# Patient Record
Sex: FEMALE | Race: WHITE | Employment: FULL TIME | URBAN - METROPOLITAN AREA
[De-identification: names, ages, dates, MRNs, and addresses within clinical notes are randomized per-mention and may not be internally consistent; named-entity substitution may affect disease eponyms.]

---

## 2021-03-17 ENCOUNTER — TRANSCRIBE ORDER (OUTPATIENT)
Dept: SCHEDULING | Age: 61
End: 2021-03-17

## 2021-03-17 DIAGNOSIS — Z12.31 ENCOUNTER FOR SCREENING MAMMOGRAM FOR MALIGNANT NEOPLASM OF BREAST: Primary | ICD-10-CM

## 2023-09-09 ENCOUNTER — HOSPITAL ENCOUNTER (EMERGENCY)
Age: 63
Discharge: HOME | End: 2023-09-09
Attending: STUDENT IN AN ORGANIZED HEALTH CARE EDUCATION/TRAINING PROGRAM
Payer: COMMERCIAL

## 2023-09-09 ENCOUNTER — APPOINTMENT (OUTPATIENT)
Dept: CT IMAGING | Age: 63
End: 2023-09-09
Payer: COMMERCIAL

## 2023-09-09 ENCOUNTER — APPOINTMENT (OUTPATIENT)
Dept: GENERAL RADIOLOGY | Age: 63
End: 2023-09-09
Payer: COMMERCIAL

## 2023-09-09 VITALS
DIASTOLIC BLOOD PRESSURE: 73 MMHG | HEART RATE: 82 BPM | SYSTOLIC BLOOD PRESSURE: 122 MMHG | OXYGEN SATURATION: 94 % | BODY MASS INDEX: 20.25 KG/M2 | HEIGHT: 66 IN | RESPIRATION RATE: 25 BRPM | TEMPERATURE: 98.4 F | WEIGHT: 126 LBS

## 2023-09-09 DIAGNOSIS — J18.9 PNEUMONIA DUE TO INFECTIOUS ORGANISM, UNSPECIFIED LATERALITY, UNSPECIFIED PART OF LUNG: Primary | ICD-10-CM

## 2023-09-09 LAB
ALBUMIN SERPL-MCNC: 3.4 G/DL (ref 3.2–4.6)
ALBUMIN/GLOB SERPL: 0.7 (ref 0.4–1.6)
ALP SERPL-CCNC: 97 U/L (ref 50–136)
ALT SERPL-CCNC: 15 U/L (ref 12–65)
ANION GAP SERPL CALC-SCNC: 2 MMOL/L (ref 2–11)
AST SERPL-CCNC: 13 U/L (ref 15–37)
BASOPHILS # BLD: 0.1 K/UL (ref 0–0.2)
BASOPHILS NFR BLD: 1 % (ref 0–2)
BILIRUB SERPL-MCNC: 0.5 MG/DL (ref 0.2–1.1)
BUN SERPL-MCNC: 10 MG/DL (ref 8–23)
CALCIUM SERPL-MCNC: 9.6 MG/DL (ref 8.3–10.4)
CHLORIDE SERPL-SCNC: 108 MMOL/L (ref 101–110)
CO2 SERPL-SCNC: 32 MMOL/L (ref 21–32)
CREAT SERPL-MCNC: 1 MG/DL (ref 0.6–1)
DIFFERENTIAL METHOD BLD: ABNORMAL
EOSINOPHIL # BLD: 0.2 K/UL (ref 0–0.8)
EOSINOPHIL NFR BLD: 1 % (ref 0.5–7.8)
ERYTHROCYTE [DISTWIDTH] IN BLOOD BY AUTOMATED COUNT: 12 % (ref 11.9–14.6)
GLOBULIN SER CALC-MCNC: 4.8 G/DL (ref 2.8–4.5)
GLUCOSE SERPL-MCNC: 108 MG/DL (ref 65–100)
HCT VFR BLD AUTO: 44.5 % (ref 35.8–46.3)
HGB BLD-MCNC: 14.2 G/DL (ref 11.7–15.4)
IMM GRANULOCYTES # BLD AUTO: 0.1 K/UL (ref 0–0.5)
IMM GRANULOCYTES NFR BLD AUTO: 1 % (ref 0–5)
LYMPHOCYTES # BLD: 1.8 K/UL (ref 0.5–4.6)
LYMPHOCYTES NFR BLD: 13 % (ref 13–44)
MAGNESIUM SERPL-MCNC: 2.1 MG/DL (ref 1.8–2.4)
MCH RBC QN AUTO: 29.3 PG (ref 26.1–32.9)
MCHC RBC AUTO-ENTMCNC: 31.9 G/DL (ref 31.4–35)
MCV RBC AUTO: 91.8 FL (ref 82–102)
MONOCYTES # BLD: 0.6 K/UL (ref 0.1–1.3)
MONOCYTES NFR BLD: 4 % (ref 4–12)
NEUTS SEG # BLD: 11.2 K/UL (ref 1.7–8.2)
NEUTS SEG NFR BLD: 80 % (ref 43–78)
NRBC # BLD: 0 K/UL (ref 0–0.2)
PLATELET # BLD AUTO: 412 K/UL (ref 150–450)
PMV BLD AUTO: 9.5 FL (ref 9.4–12.3)
POTASSIUM SERPL-SCNC: 3.8 MMOL/L (ref 3.5–5.1)
PROT SERPL-MCNC: 8.2 G/DL (ref 6.3–8.2)
RBC # BLD AUTO: 4.85 M/UL (ref 4.05–5.2)
SODIUM SERPL-SCNC: 142 MMOL/L (ref 133–143)
TROPONIN I SERPL HS-MCNC: 3.2 PG/ML (ref 0–14)
WBC # BLD AUTO: 13.9 K/UL (ref 4.3–11.1)

## 2023-09-09 PROCEDURE — 80053 COMPREHEN METABOLIC PANEL: CPT

## 2023-09-09 PROCEDURE — 93005 ELECTROCARDIOGRAM TRACING: CPT | Performed by: STUDENT IN AN ORGANIZED HEALTH CARE EDUCATION/TRAINING PROGRAM

## 2023-09-09 PROCEDURE — 99285 EMERGENCY DEPT VISIT HI MDM: CPT

## 2023-09-09 PROCEDURE — 84484 ASSAY OF TROPONIN QUANT: CPT

## 2023-09-09 PROCEDURE — 71260 CT THORAX DX C+: CPT

## 2023-09-09 PROCEDURE — 83735 ASSAY OF MAGNESIUM: CPT

## 2023-09-09 PROCEDURE — 85025 COMPLETE CBC W/AUTO DIFF WBC: CPT

## 2023-09-09 PROCEDURE — 71045 X-RAY EXAM CHEST 1 VIEW: CPT

## 2023-09-09 PROCEDURE — 6360000004 HC RX CONTRAST MEDICATION

## 2023-09-09 RX ORDER — DOXYCYCLINE HYCLATE 100 MG
100 TABLET ORAL 2 TIMES DAILY
Qty: 14 TABLET | Refills: 0 | Status: SHIPPED | OUTPATIENT
Start: 2023-09-09 | End: 2023-09-16

## 2023-09-09 RX ORDER — BENZONATATE 100 MG/1
100 CAPSULE ORAL 3 TIMES DAILY PRN
Qty: 30 CAPSULE | Refills: 0 | Status: SHIPPED | OUTPATIENT
Start: 2023-09-09 | End: 2023-09-19

## 2023-09-09 RX ADMIN — IOPAMIDOL 100 ML: 755 INJECTION, SOLUTION INTRAVENOUS at 21:17

## 2023-09-09 ASSESSMENT — LIFESTYLE VARIABLES
HOW MANY STANDARD DRINKS CONTAINING ALCOHOL DO YOU HAVE ON A TYPICAL DAY: PATIENT DOES NOT DRINK
HOW OFTEN DO YOU HAVE A DRINK CONTAINING ALCOHOL: NEVER

## 2023-09-09 ASSESSMENT — PAIN - FUNCTIONAL ASSESSMENT: PAIN_FUNCTIONAL_ASSESSMENT: 0-10

## 2023-09-09 ASSESSMENT — PAIN DESCRIPTION - LOCATION: LOCATION: CHEST

## 2023-09-09 ASSESSMENT — PAIN SCALES - GENERAL: PAINLEVEL_OUTOF10: 4

## 2023-09-09 NOTE — ED TRIAGE NOTES
Patient to ER from home with c/o of having been to urgent care and was sent here, patient has been on antibiotics since Friday, but has been sick for over a month, patient states her PCP did covid, strep, and flu and all were negative, patient has cough at that time, has been told she has COPD

## 2023-09-10 LAB
EKG ATRIAL RATE: 90 BPM
EKG DIAGNOSIS: NORMAL
EKG P AXIS: 81 DEGREES
EKG P-R INTERVAL: 157 MS
EKG Q-T INTERVAL: 340 MS
EKG QRS DURATION: 80 MS
EKG QTC CALCULATION (BAZETT): 414 MS
EKG R AXIS: 84 DEGREES
EKG T AXIS: 68 DEGREES
EKG VENTRICULAR RATE: 89 BPM

## 2023-09-10 PROCEDURE — 93010 ELECTROCARDIOGRAM REPORT: CPT | Performed by: INTERNAL MEDICINE

## 2023-09-10 NOTE — DISCHARGE INSTRUCTIONS
Take antibiotics as prescribed. Continue taking your Augmentin along with doxycycline. Use caution with sun exposure. Take Tessalon Perles as needed for cough. Follow-up with your family physician within the week. Return to the ER for worsening or worrisome symptoms. You have been referred to pulmonology, contact their office if you do not hear from them in 2-3 business days. Below is the result from your CT scan, radiology recommended outpatient follow-up in 8 weeks to ensure resolution of findings     1. Moderate patchy bilateral tree-in-bud nodular and minor consolidative   opacities, probable bronchopneumonia. Recommend CT chest followed in eight weeks    to exclude underlying mass nodule. 2.  No evidence of pulmonary embolus.

## 2023-09-10 NOTE — ED PROVIDER NOTES
Emergency Department Provider Note       PCP: Enriqueta Anderson MD   Age: 66 y.o. Sex: female     DISPOSITION Decision To Discharge 09/09/2023 10:20:33 PM       ICD-10-CM    1. Pneumonia due to infectious organism, unspecified laterality, unspecified part of lung  J18.9 [facility]          Medical Decision Making     Complexity of Problems Addressed:  1 acute on chronic complaint requiring work-up    Data Reviewed and Analyzed:   I independently ordered and reviewed each unique test.  I reviewed external records: provider visit note from PCP. The patients assessment required an independent historian: Family. The reason they were needed is important historical information not provided by the patient. I independently ordered and interpreted the ED EKG in the absence of a Cardiologist.    Rate: 89  EKG Interpretation: EKG Interpretation: sinus rhythm, no evidence of arrhythmia  ST Segments: Nonspecific ST segments - NO STEMI  I interpreted the X-rays no pneumothorax. I interpreted the CT Scan no pneumothorax. Discussion of management or test interpretation. 63F presents emergency department with family bedside. Patient complains of shortness of breath, cough and intermittent chest pressure. Reports the pressure occurs when she is having coughing fits. Has been on 2 rounds antibiotics, was placed on Levaquin few weeks ago without any resolution, started on Augmentin today. Sent to the ER from urgent care for further evaluation. COVID strep and flu have all been negative on outpatient work-up. Patient believes she possibly could have been exposed to mold and other fungi. Denies history of lung issues. Patient reports having episodes of coughing fits and shortness of breath a few times a year. Denies seeing pulmonologist.  Does not smoke cigarettes. Will obtain a broad-based work-up.   Labs shows white count 13.9, stable H&H, normal electrolytes and kidney

## 2023-09-14 ENCOUNTER — OFFICE VISIT (OUTPATIENT)
Dept: PULMONOLOGY | Age: 63
End: 2023-09-14
Payer: COMMERCIAL

## 2023-09-14 ENCOUNTER — PREP FOR PROCEDURE (OUTPATIENT)
Dept: PULMONOLOGY | Age: 63
End: 2023-09-14

## 2023-09-14 VITALS
HEART RATE: 80 BPM | DIASTOLIC BLOOD PRESSURE: 80 MMHG | WEIGHT: 128 LBS | BODY MASS INDEX: 20.57 KG/M2 | SYSTOLIC BLOOD PRESSURE: 122 MMHG | TEMPERATURE: 97.5 F | OXYGEN SATURATION: 96 % | RESPIRATION RATE: 18 BRPM | HEIGHT: 66 IN

## 2023-09-14 DIAGNOSIS — R91.8 ABNORMAL CT SCAN OF LUNG: ICD-10-CM

## 2023-09-14 DIAGNOSIS — R06.02 SOB (SHORTNESS OF BREATH): Primary | ICD-10-CM

## 2023-09-14 DIAGNOSIS — R06.02 SOB (SHORTNESS OF BREATH): ICD-10-CM

## 2023-09-14 PROBLEM — J18.9 PNEUMONIA: Status: ACTIVE | Noted: 2023-09-14

## 2023-09-14 LAB
EXPIRATORY TIME: NORMAL
FEF 25-75% %PRED-PRE: NORMAL
FEF 25-75% PRED: NORMAL
FEF 25-75%-PRE: NORMAL
FEV1 %PRED-PRE: 53 %
FEV1 PRED: NORMAL
FEV1/FVC %PRED-PRE: NORMAL
FEV1/FVC PRED: NORMAL
FEV1/FVC: 75 %
FEV1: 1.45 L
FVC %PRED-PRE: 55 %
FVC PRED: NORMAL
FVC: 1.93 L
PEF %PRED-PRE: NORMAL
PEF PRED: NORMAL
PEF-PRE: NORMAL

## 2023-09-14 PROCEDURE — 94010 BREATHING CAPACITY TEST: CPT | Performed by: INTERNAL MEDICINE

## 2023-09-14 PROCEDURE — 99204 OFFICE O/P NEW MOD 45 MIN: CPT | Performed by: NURSE PRACTITIONER

## 2023-09-14 RX ORDER — BACILLUS COAGULANS/INULIN 1B-250 MG
CAPSULE ORAL
COMMUNITY

## 2023-09-14 RX ORDER — SODIUM CHLORIDE 0.9 % (FLUSH) 0.9 %
5-40 SYRINGE (ML) INJECTION EVERY 12 HOURS SCHEDULED
Status: CANCELLED | OUTPATIENT
Start: 2023-09-14

## 2023-09-14 RX ORDER — MULTIVIT WITH MINERALS/LUTEIN
TABLET ORAL
COMMUNITY

## 2023-09-14 RX ORDER — ALBUTEROL SULFATE 90 UG/1
AEROSOL, METERED RESPIRATORY (INHALATION)
COMMUNITY
Start: 2023-09-08

## 2023-09-14 RX ORDER — SODIUM CHLORIDE 9 MG/ML
INJECTION, SOLUTION INTRAVENOUS PRN
Status: CANCELLED | OUTPATIENT
Start: 2023-09-14

## 2023-09-14 RX ORDER — SODIUM CHLORIDE 0.9 % (FLUSH) 0.9 %
5-40 SYRINGE (ML) INJECTION PRN
Status: CANCELLED | OUTPATIENT
Start: 2023-09-14

## 2023-09-14 RX ORDER — AMOXICILLIN AND CLAVULANATE POTASSIUM 875; 125 MG/1; MG/1
1 TABLET, FILM COATED ORAL EVERY 12 HOURS
COMMUNITY
Start: 2023-09-08 | End: 2023-09-15

## 2023-09-14 RX ORDER — ACETAMINOPHEN 160 MG
TABLET,DISINTEGRATING ORAL
COMMUNITY

## 2023-09-14 ASSESSMENT — PULMONARY FUNCTION TESTS
FEV1: 1.45
FVC_PERCENT_PREDICTED_PRE: 55
FVC: 1.93
FEV1/FVC: 75
FEV1_PERCENT_PREDICTED_PRE: 53

## 2023-09-14 NOTE — H&P (VIEW-ONLY)
Name:  Kerri Navarro  YOB: 1960   MRN: [id number]      Office Visit: 2023        ASSESSMENT AND PLAN:  (Medical Decision Making)    Impression: 66 y.o. female with a history of multiple bouts of \"pneumonia and pneumonitis\" with chronic cough during these episodes that seem to be precipitated by mold exposure. 1. SOB (shortness of breath)  --Significant with cough. Currently taking doxycycline, so would finish this. --She is hesitant to use her albuterol stating that she read that it can spread fungal infections. Explained that if she is struggling with her breathing that might, she needs to use her albuterol.  - NITRIC OXIDE  GAS DETERMINATION  - Spirometry Without Bronchodilator  - Allergen Fungi & Mold A. Fumigatus, IgE; Future  - IgE; Future  - VANI, Direct, w/Reflex; Future  - Hypersensitivity pnuemonitis profile; Future  - Bordetella Pertussis / Parapertussis PCR; Future  - ANCA Panel; Future    2. Abnormal CT scan of lung  --Differential is wide fullness. MAC, poorly controlled asthma, less concern for ABPA, but will check an ANCA as well as an IgE against Aspergillus. We will set up for [facility] for Tuesday next week for cleanout as well as a BAL and fungal cultures along with an AFP. --Sending for labs today including hypersensitivity, pertussis, IgE, VANI    - Allergen Fungi & Mold A. Fumigatus, IgE; Future  - IgE; Future  - VANI, Direct, w/Reflex; Future  - Hypersensitivity pnuemonitis profile; Future  - Bordetella Pertussis / Parapertussis PCR; Future  - ANCA Panel; Future    No orders of the defined types were placed in this encounter. No orders of the defined types were placed in this encounter. Images and case were reviewed with Dr. Debbie Olivares since he will be doing her bronchoscopy on Tuesday. Follow-up and Dispositions    Return in about 6 weeks (around 10/26/2023) for dyspnea, Ryanne or Sine.        TING See - CNP    No specialty comments

## 2023-09-14 NOTE — PROGRESS NOTES
Name:  Viral Martinez  YOB: 1960   MRN: [id number]      Office Visit: 2023        ASSESSMENT AND PLAN:  (Medical Decision Making)    Impression: 66 y.o. female with a history of multiple bouts of \"pneumonia and pneumonitis\" with chronic cough during these episodes that seem to be precipitated by mold exposure. 1. SOB (shortness of breath)  --Significant with cough. Currently taking doxycycline, so would finish this. --She is hesitant to use her albuterol stating that she read that it can spread fungal infections. Explained that if she is struggling with her breathing that might, she needs to use her albuterol.  - NITRIC OXIDE  GAS DETERMINATION  - Spirometry Without Bronchodilator  - Allergen Fungi & Mold A. Fumigatus, IgE; Future  - IgE; Future  - VANI, Direct, w/Reflex; Future  - Hypersensitivity pnuemonitis profile; Future  - Bordetella Pertussis / Parapertussis PCR; Future  - ANCA Panel; Future    2. Abnormal CT scan of lung  --Differential is wide fullness. MAC, poorly controlled asthma, less concern for ABPA, but will check an ANCA as well as an IgE against Aspergillus. We will set up for [facility] for Tuesday next week for cleanout as well as a BAL and fungal cultures along with an AFP. --Sending for labs today including hypersensitivity, pertussis, IgE, VANI    - Allergen Fungi & Mold A. Fumigatus, IgE; Future  - IgE; Future  - VANI, Direct, w/Reflex; Future  - Hypersensitivity pnuemonitis profile; Future  - Bordetella Pertussis / Parapertussis PCR; Future  - ANCA Panel; Future    No orders of the defined types were placed in this encounter. No orders of the defined types were placed in this encounter. Images and case were reviewed with Dr. Alejandro Angeles since he will be doing her bronchoscopy on Tuesday. Follow-up and Dispositions    Return in about 6 weeks (around 10/26/2023) for dyspnea, Ryanne or Sine.        Caleb Duane, APRN - CNP    No specialty comments

## 2023-09-15 NOTE — PERIOP NOTE
Patient verified name and . Order for consent  found in EHR and matches case posting; patient verifies procedure. Type 1B surgery, PAT phone assessment complete. Orders received. Labs per surgeon: none  Labs per anesthesia protocol: none indicated    Patient answered medical/surgical history questions at their best of ability. All prior to admission medications documented in EPIC. Patient instructed to take the following medications the day of surgery according to anesthesia guidelines with a small sip of water: use and bring albuterol inhaler On the day before surgery please take Tylenol (Acetaminophen) 1000mg in the morning and then again before bed. You may substitute for Tylenol 650 mg. Hold all vitamins 7 days prior to surgery and NSAIDS 5 days prior to surgery. Prescription meds to hold:vitamins and supplements  Patient instructed on the following:    > Arrive at [facility], time of arrival to be called the day before by 1700  > NPO after midnight, unless otherwise indicated, including gum, mints, and ice chips  > Responsible adult must drive patient to the hospital, stay during surgery, and patient will need supervision 24 hours after anesthesia  > Use antibacterial soap in shower the night before surgery and on the morning of surgery  > All piercings must be removed prior to arrival.    > Leave all valuables (money and jewelry) at home but bring insurance card and ID on DOS.   > You may be required to pay a deductible or co-pay on the day of your procedure. You can pre-pay by calling [phone] if your surgery is at the [facility] or [phone] if your surgery is at the [facility]. > Do not wear make-up, nail polish, lotions, cologne, perfumes, powders, or oil on skin. Artificial nails are not permitted.

## 2023-09-16 LAB — ANA SER QL: NEGATIVE

## 2023-09-17 LAB
A FUMIGATUS IGE QN: <0.1 KU/L
IGE SERPL-ACNC: 60 IU/ML (ref 6–495)

## 2023-09-18 ENCOUNTER — TELEPHONE (OUTPATIENT)
Dept: PULMONOLOGY | Age: 63
End: 2023-09-18

## 2023-09-18 LAB
C-ANCA TITR SER IF: NORMAL TITER
MYELOPEROXIDASE AB SER IA-ACNC: <0.2 UNITS (ref 0–0.9)
P-ANCA ATYPICAL TITR SER IF: NORMAL TITER
P-ANCA TITR SER IF: NORMAL TITER
PROTEINASE3 AB SER IA-ACNC: <0.2 UNITS (ref 0–0.9)

## 2023-09-18 NOTE — RESULT ENCOUNTER NOTE
Please let patient know that several lab results back and so far normal.  Will update her as new ones come in.

## 2023-09-18 NOTE — TELEPHONE ENCOUNTER
Called and spoke with the patient to let her know as per Ms. Sage Rose several of her lab results back and so far normal.  Will update her as new ones come in. Patient voice understanding.  Jasen BONNER

## 2023-09-19 ENCOUNTER — HOSPITAL ENCOUNTER (OUTPATIENT)
Age: 63
Setting detail: OUTPATIENT SURGERY
Discharge: HOME | End: 2023-09-19
Attending: INTERNAL MEDICINE | Admitting: INTERNAL MEDICINE
Payer: COMMERCIAL

## 2023-09-19 VITALS
BODY MASS INDEX: 20.41 KG/M2 | HEIGHT: 66 IN | RESPIRATION RATE: 18 BRPM | TEMPERATURE: 97.7 F | DIASTOLIC BLOOD PRESSURE: 70 MMHG | WEIGHT: 127 LBS | SYSTOLIC BLOOD PRESSURE: 122 MMHG | HEART RATE: 65 BPM | OXYGEN SATURATION: 100 %

## 2023-09-19 DIAGNOSIS — J18.9 PNEUMONIA: ICD-10-CM

## 2023-09-19 PROBLEM — R93.89 ABNORMAL CT OF THE CHEST: Status: ACTIVE | Noted: 2023-09-19

## 2023-09-19 PROBLEM — R05.3 CHRONIC COUGH: Status: ACTIVE | Noted: 2023-09-19

## 2023-09-19 LAB
DIFFERENTIAL: NORMAL
EOSINOPHIL NFR BRONCH MANUAL: 0 %
LYMPHOCYTES NFR BRONCH MANUAL: 38 %
MACROPHAGES NFR BRONCH MANUAL: 6 %
NEUTROPHILS NFR BRONCH MANUAL: 56 %
OTHER CELLS NFR BLD MANUAL: 3

## 2023-09-19 PROCEDURE — 94640 AIRWAY INHALATION TREATMENT: CPT

## 2023-09-19 PROCEDURE — 88112 CYTOPATH CELL ENHANCE TECH: CPT

## 2023-09-19 PROCEDURE — 87799 DETECT AGENT NOS DNA QUANT: CPT

## 2023-09-19 PROCEDURE — 87102 FUNGUS ISOLATION CULTURE: CPT

## 2023-09-19 PROCEDURE — 2709999900 HC NON-CHARGEABLE SUPPLY: Performed by: INTERNAL MEDICINE

## 2023-09-19 PROCEDURE — 94760 N-INVAS EAR/PLS OXIMETRY 1: CPT

## 2023-09-19 PROCEDURE — 87206 SMEAR FLUORESCENT/ACID STAI: CPT

## 2023-09-19 PROCEDURE — 99152 MOD SED SAME PHYS/QHP 5/>YRS: CPT | Performed by: INTERNAL MEDICINE

## 2023-09-19 PROCEDURE — 6360000002 HC RX W HCPCS: Performed by: INTERNAL MEDICINE

## 2023-09-19 PROCEDURE — 87116 MYCOBACTERIA CULTURE: CPT

## 2023-09-19 PROCEDURE — 87070 CULTURE OTHR SPECIMN AEROBIC: CPT

## 2023-09-19 PROCEDURE — 89051 BODY FLUID CELL COUNT: CPT

## 2023-09-19 PROCEDURE — 31624 DX BRONCHOSCOPE/LAVAGE: CPT | Performed by: INTERNAL MEDICINE

## 2023-09-19 PROCEDURE — 87185 SC STD ENZYME DETCJ PER NZM: CPT

## 2023-09-19 PROCEDURE — 87486 CHLMYD PNEUM DNA AMP PROBE: CPT

## 2023-09-19 PROCEDURE — 7100000010 HC PHASE II RECOVERY - FIRST 15 MIN: Performed by: INTERNAL MEDICINE

## 2023-09-19 PROCEDURE — 87541 LEGION PNEUMO DNA AMP PROB: CPT

## 2023-09-19 PROCEDURE — 87205 SMEAR GRAM STAIN: CPT

## 2023-09-19 PROCEDURE — 2580000003 HC RX 258: Performed by: INTERNAL MEDICINE

## 2023-09-19 PROCEDURE — 3609027000 HC BRONCHOSCOPY: Performed by: INTERNAL MEDICINE

## 2023-09-19 PROCEDURE — 7100000011 HC PHASE II RECOVERY - ADDTL 15 MIN: Performed by: INTERNAL MEDICINE

## 2023-09-19 PROCEDURE — 87581 M.PNEUMON DNA AMP PROBE: CPT

## 2023-09-19 RX ORDER — ALBUTEROL SULFATE 2.5 MG/3ML
2.5 SOLUTION RESPIRATORY (INHALATION) ONCE
Status: COMPLETED | OUTPATIENT
Start: 2023-09-19 | End: 2023-09-19

## 2023-09-19 RX ORDER — SODIUM CHLORIDE, SODIUM LACTATE, POTASSIUM CHLORIDE, CALCIUM CHLORIDE 600; 310; 30; 20 MG/100ML; MG/100ML; MG/100ML; MG/100ML
INJECTION, SOLUTION INTRAVENOUS CONTINUOUS
Status: DISCONTINUED | OUTPATIENT
Start: 2023-09-19 | End: 2023-09-19 | Stop reason: HOSPADM

## 2023-09-19 RX ORDER — SODIUM CHLORIDE 0.9 % (FLUSH) 0.9 %
5-40 SYRINGE (ML) INJECTION PRN
Status: DISCONTINUED | OUTPATIENT
Start: 2023-09-19 | End: 2023-09-19 | Stop reason: HOSPADM

## 2023-09-19 RX ORDER — SODIUM CHLORIDE 9 MG/ML
INJECTION, SOLUTION INTRAVENOUS PRN
Status: DISCONTINUED | OUTPATIENT
Start: 2023-09-19 | End: 2023-09-19 | Stop reason: HOSPADM

## 2023-09-19 RX ORDER — SODIUM CHLORIDE 0.9 % (FLUSH) 0.9 %
5-40 SYRINGE (ML) INJECTION EVERY 12 HOURS SCHEDULED
Status: DISCONTINUED | OUTPATIENT
Start: 2023-09-19 | End: 2023-09-19 | Stop reason: HOSPADM

## 2023-09-19 RX ORDER — MIDAZOLAM HYDROCHLORIDE 2 MG/2ML
INJECTION, SOLUTION INTRAMUSCULAR; INTRAVENOUS PRN
Status: DISCONTINUED | OUTPATIENT
Start: 2023-09-19 | End: 2023-09-19 | Stop reason: ALTCHOICE

## 2023-09-19 RX ADMIN — ALBUTEROL SULFATE 2.5 MG: 2.5 SOLUTION RESPIRATORY (INHALATION) at 09:11

## 2023-09-19 RX ADMIN — SODIUM CHLORIDE, POTASSIUM CHLORIDE, SODIUM LACTATE AND CALCIUM CHLORIDE: 600; 310; 30; 20 INJECTION, SOLUTION INTRAVENOUS at 07:57

## 2023-09-19 ASSESSMENT — PAIN SCALES - GENERAL
PAINLEVEL_OUTOF10: 0

## 2023-09-19 ASSESSMENT — PAIN - FUNCTIONAL ASSESSMENT
PAIN_FUNCTIONAL_ASSESSMENT: WONG-BAKER FACES
PAIN_FUNCTIONAL_ASSESSMENT: NONE - DENIES PAIN
PAIN_FUNCTIONAL_ASSESSMENT: WONG-BAKER FACES
PAIN_FUNCTIONAL_ASSESSMENT: 0-10

## 2023-09-19 NOTE — INTERVAL H&P NOTE
Update History & Physical    The patient's History and Physical of September 14,  was reviewed with the patient and I examined the patient. There was no change. The surgical site was confirmed by the patient and me. Plan: The risks, benefits, expected outcome, and alternative to the recommended procedure have been discussed with the patient. Patient understands and wants to proceed with the procedure.      Electronically signed by Stephanie Vincent MD on 9/19/2023 at 8:24 AM

## 2023-09-19 NOTE — OP NOTE
PROCEDURE    Bronchoscopy with airway inspection /therapeutic aspiration of secretions /BAL    INDICATION   Cough  Abnormal CT chest with tree in bud opacities      EQUIPMENT:  Olympus  Bronchoscope    ANESTHESIA    Concious sedation with: Fentanyl 100 mcg IV; Versed 2mg IV; Lidocaine 200 mg to tracheo-bronchial tree and vocal cords    IMAGING:    CT Chest  9/9/23      AIRWAY INSPECTION    After obtaining informed consent, Olympus  Bronchoscopewas introduced into the trachea without complication. RIGHT    LOCATION NORM/ABNORM DESCRIPTION   Larynx NL    VOCAL CORDS NL    TRACHEA NL    FRANKLIN NL    RMSB NL    RUL NL    BI NL    RML NL    RLL NL    SUP SEGM RLL NL    MED BASAL NL    ANTERIOR BASAL NL    LATERAL BASAL NL    POSTERIOR BASAL NL               LEFT    LOCATION NORM/ABNORM DESCRIPTION   LMSB NL    NOE NL    LINGULA NL    LLL NL    SUPERIOR SEG LLL NL    PEARL-MEDIAL LLL NL    LATERAL LLL NL    POSTERIOR LLL NL      The following samples were obtained:    BAL: RML    Samples were sent for:  Cell count, diff, culture, afb, fungal, cytology, aspergillus pcr, atypical pna, legionella    The procedure was completed without complication and the patient tolerated the procedure well. EBL: None    Recommendations: Follow up the above studies and treat accordingly.      Desmond Hamman, MD

## 2023-09-19 NOTE — PROGRESS NOTES
0850-Pt coughing. Dr. Bettye Lai at bedside. Albuterol nebulizer ordered. Diana Sepulveda, RT notified. 0912-Post nebulizer, pts cough more infrequent. Discharge instructions were reviewed with patient and Leticia Perry. An opportunity was given for questions. Patient and Leticia Perry verbalized understanding, and has no questions at this time.

## 2023-09-19 NOTE — DISCHARGE INSTRUCTIONS
RESPIRATORY CARE - BRONCHOSCOPY - DISCHARGE INSTRUCTIONS      You received a lot of numbing medication for your throat and nose, and you also received medication to make you sleepy during your procedure. Because of this and because the bronchoscopy may have irritated your airways, we ask that you follow these directions:    1. Do not eat or drink until  0940 . After that, you may have what you please. You may want to try some liquids first, because your throat may be a little sore. 2. Medication may cause drowsiness for several hours, therefore:  Do not drive or operate machinery for remainder of the day. No alcohol today  Do not make any important or legal decisions for 24 hours  Do not sign any legal documents for 24 hours    3. You may cough up more mucus than usual and you may see some blood, but this is expected and should subside by the following day. 4. If severe throat irritation, coughing, or bleeding continue, call your doctor. 5.         If you run a fever greater than 102, call [facility] at [phone]. 6.         Dr. Roberto Downey has asked that you:                A. Call the doctor's office at [phone] for any questions or problems occur. B. See him in the office as previously scheduled prior to procedure.     Discharge Medications:  Current Discharge Medication List        CONTINUE these medications which have NOT CHANGED    Details   albuterol sulfate HFA (PROVENTIL;VENTOLIN;PROAIR) 108 (90 Base) MCG/ACT inhaler INHALE 2 PUFFS EVERY 4 TO 6 HOURS AS NEEDED FOR SHORTNESS OF BREATH OR FOR WHEEZE      Bacillus Coagulans-Inulin (PROBIOTIC) 1-250 BILLION-MG CAPS Take by mouth      Collagen Hydrolysate, Bovine, POWD by Other route      Cholecalciferol (VITAMIN D3) 50 MCG (2000 UT) CAPS       Ascorbic Acid (VITAMIN C) 1000 MG tablet 1 tab(s)      benzonatate (TESSALON) 100 MG capsule Take 1 capsule by mouth 3 times daily as needed for Cough  Qty: 30

## 2023-09-20 LAB
ACID FAST STN SPEC: NEGATIVE
MYCOBACTERIUM SPEC QL CULT: NORMAL
SPECIMEN PREPARATION: NORMAL
SPECIMEN SOURCE: NORMAL

## 2023-09-21 LAB
A FUMIGATUS DNA SPEC QL NAA+PROBE: NOT DETECTED
A TERREUS DNA SPEC QL NAA+PROBE: NOT DETECTED
ASPERGILLUS DNA SPEC QL NAA+PROBE: NOT DETECTED
C PNEUMONIAE PCR: NOT DETECTED
FUNGAL CULT/SMEAR: NORMAL
FUNGUS SMEAR: NORMAL
L PNEUMO DNA LOWER RESP QL NAA+PROBE: NOT DETECTED
LEGIONELLA DNA SPEC NAA+PROBE: NOT DETECTED
MYCOPLASMA PNEUMONIAE PCR: NOT DETECTED
SPECIMEN PROCESSING: NORMAL
SPECIMEN SOURCE: NORMAL
SPECIMEN SOURCE: NORMAL

## 2023-09-22 LAB
A FUMIGATUS1 AB SER QL ID: NEGATIVE
A PULLULANS AB SER QL: NEGATIVE
BACTERIA SPEC CULT: ABNORMAL
BACTERIA SPEC CULT: ABNORMAL
GRAM STN SPEC: ABNORMAL
LACEYELLA SACCHARI AB SER QL: NEGATIVE
PIGEON SERUM AB QL ID: NEGATIVE
S RECTIVIRGULA IGG SER QL ID: NEGATIVE
SERVICE CMNT-IMP: ABNORMAL
T VULGARIS AB SER QL ID: NEGATIVE

## 2023-09-22 RX ORDER — AMOXICILLIN AND CLAVULANATE POTASSIUM 875; 125 MG/1; MG/1
1 TABLET, FILM COATED ORAL 2 TIMES DAILY
Qty: 20 TABLET | Refills: 0 | Status: SHIPPED | OUTPATIENT
Start: 2023-09-22 | End: 2023-10-02

## 2023-09-22 NOTE — PROGRESS NOTES
Cultures growing H flu. Called patient to discuss results. Has not been treated with Augmentin, so will do 10 day course of 875. Encouraged to use albuterol if needed. With episodes like this, may need immune deficiency testing. Has close follow up with me in 4 weeks and will see how symptoms are at that time.

## 2023-09-25 LAB
FUNGUS SMEAR: NORMAL
SPECIMEN SOURCE: NORMAL

## 2023-09-29 ENCOUNTER — TELEPHONE (OUTPATIENT)
Dept: PULMONOLOGY | Age: 63
End: 2023-09-29

## 2023-09-29 RX ORDER — CEFDINIR 300 MG/1
300 CAPSULE ORAL 2 TIMES DAILY
Qty: 10 CAPSULE | Refills: 0 | Status: SHIPPED | OUTPATIENT
Start: 2023-09-29 | End: 2023-10-04

## 2023-09-29 NOTE — TELEPHONE ENCOUNTER
Patient has been taking Augmentin for 1 week but now has hives that itch.   Asking what she can take

## 2023-09-29 NOTE — TELEPHONE ENCOUNTER
Called and discussed with patient. She had taken benadryl for now. Still with some throat tightness. Advised if any worse, needs to go to ED. Also advised to take pepcid. She cannot take prednisone. Stop augmentin. She has had 7 days worth, rash started on day 5. Will start cefdinir 300mg BID for 5 days, to start Sunday morning.

## 2023-09-29 NOTE — TELEPHONE ENCOUNTER
Patient calling to ask what she can take to help reduce reaction she is having, doesn't want to stop augmentin because it seemed to be working. Started last Thursday (9/21) by Monday & Tuesday (9/25-26) felt great & very relieved. Dealt with hives all day yesterday, continues to have some, but today throat feels tight, causing her voice straining and feels pressure. Reviewed other allergies of soy & wheat w/ sensitivity to prednisone. Absolutely sure no exposures to known allergies. Wants to know what she can take to stop reaction. Advised to take 25mg of benadryl now and MD will be consulted.

## 2023-09-29 NOTE — TELEPHONE ENCOUNTER
Spoke to MD directly, benedryl with augmentin is approved, but with building symptoms such as pressure in throat needs to proceed to ER for evaluation. Advised patient that ER evaluation is most advisable, she expresses that she cannot do that, she is at work and will get benedryl; advised to take 50mg now and follow box instructions; advised to be sure a family member is aware and ready to respond this afternoon & evening.

## 2023-10-17 LAB
FUNGAL CULT/SMEAR: NORMAL
FUNGUS (MYCOLOGY) CULTURE: NORMAL
FUNGUS SMEAR: NORMAL
REFLEX TO ID: NORMAL
SPECIMEN PROCESSING: NORMAL
SPECIMEN SOURCE: NORMAL
SPECIMEN SOURCE: NORMAL

## 2023-10-23 ENCOUNTER — OFFICE VISIT (OUTPATIENT)
Dept: PULMONOLOGY | Age: 63
End: 2023-10-23
Payer: COMMERCIAL

## 2023-10-23 VITALS
TEMPERATURE: 98 F | OXYGEN SATURATION: 98 % | DIASTOLIC BLOOD PRESSURE: 82 MMHG | WEIGHT: 127 LBS | HEART RATE: 98 BPM | HEIGHT: 66 IN | SYSTOLIC BLOOD PRESSURE: 124 MMHG | BODY MASS INDEX: 20.41 KG/M2 | RESPIRATION RATE: 18 BRPM

## 2023-10-23 DIAGNOSIS — R06.02 SOB (SHORTNESS OF BREATH): ICD-10-CM

## 2023-10-23 DIAGNOSIS — Z86.19 FREQUENT INFECTIONS: Primary | ICD-10-CM

## 2023-10-23 DIAGNOSIS — R91.8 ABNORMAL CT SCAN OF LUNG: ICD-10-CM

## 2023-10-23 PROCEDURE — 99214 OFFICE O/P EST MOD 30 MIN: CPT | Performed by: NURSE PRACTITIONER

## 2023-10-23 RX ORDER — NICOTINE POLACRILEX 2 MG
GUM BUCCAL
COMMUNITY

## 2023-10-23 RX ORDER — BUDESONIDE AND FORMOTEROL FUMARATE DIHYDRATE 160; 4.5 UG/1; UG/1
2 AEROSOL RESPIRATORY (INHALATION) 2 TIMES DAILY
Qty: 1 EACH | Refills: 11 | Status: SHIPPED | OUTPATIENT
Start: 2023-10-23

## 2023-10-23 NOTE — PROGRESS NOTES
Name:  Hussein Terrell  YOB: 1960   MRN: [id number]      Office Visit: 10/23/2023        ASSESSMENT AND PLAN:  (Medical Decision Making)    Impression: 66 y.o. female with a history of multiple bouts of \"pneumonia and pneumonitis\" with chronic cough during these episodes that seem to be precipitated by mold exposure. 1. SOB (shortness of breath)  -- Status post bronchoscopy with findings of H. influenzae on cultures. Negative AFB, cytology. Started on Augmentin and had a reaction so changed to cefdinir. Somewhat improved for several days after taking antibiotics and then within a week started having more cough and congestion again. She is also having wheezing.  -- Start Symbicort to see if this offers any improvement in wheezing, shortness of breath. -- Sister has an IgG deficiency, so we will send down for immune deficiency testing today. -- We will order her flutter valve. She is hesitant on medications    2. Abnormal CT scan of lung  --will continue to monitor this. Will need repeat CT at some point. No orders of the defined types were placed in this encounter. No orders of the defined types were placed in this encounter. TING Hankins - CNP    Collaborating physician is Ann Zavala MD      _________________________________________________________________________    HISTORY OF PRESENT ILLNESS:    Ms. Isaura Sterling is a 66 y.o. female who is seen at [facility] today for  Follow-up and Other (Dyspnea)     Ms Mee Dumont  is here today for follow-up visit related to recurrent pneumonias, chronic cough and shortness of breath. She is now status post [facility] findings of H. influenzae on cultures. Started treatment with Augmentin and initially got better and then had significant drug reaction. Changed to OLIVIER ADRIANA and overall feels like she did improve until about a week after antibiotics completed.   She again has cough, mucus production and pain in her

## 2023-10-23 NOTE — PATIENT INSTRUCTIONS
If any of your medicines are really expensive, we can usually help you with a work-around. We understand that insurance companies have different 'preferred' medications. These preferences can change yearly and can be difficult to track. Depending upon your insurance and their preferred medicines, some medications we prescribe may be too expensive. If this happens, we recommend that you find out what inhalers for COPD or asthma are \"preferred\" on your insurance drug formulary by calling the member services phone number on the back of the insurance card. The cost of your medication can be dramatically different depending on this. Once the preferred inhalers are known, please send us a message in [facility] or call us back at [phone] with this information. We will then choose the best option available for you and send that prescription to your pharmacy on file.         ICS/LABA Inhalers:  Fluticasone/Salmeterol inhaler (Advair, Airduo, Wixela)  [employer] [place]

## 2023-11-02 LAB
ACID FAST STN SPEC: NEGATIVE
MYCOBACTERIUM SPEC QL CULT: NEGATIVE
SPECIMEN PREPARATION: NORMAL
SPECIMEN SOURCE: NORMAL

## 2023-12-29 ENCOUNTER — HOSPITAL ENCOUNTER (OUTPATIENT)
Dept: CT IMAGING | Age: 63
Discharge: HOME | End: 2023-12-29
Payer: COMMERCIAL

## 2023-12-29 DIAGNOSIS — R93.89 ABNORMAL CT OF THE CHEST: ICD-10-CM

## 2023-12-29 LAB — CREAT BLD-MCNC: 0.57 MG/DL (ref 0.8–1.5)

## 2023-12-29 PROCEDURE — 82565 ASSAY OF CREATININE: CPT

## 2023-12-29 PROCEDURE — 71260 CT THORAX DX C+: CPT

## 2023-12-29 PROCEDURE — 6360000004 HC RX CONTRAST MEDICATION: Performed by: FAMILY MEDICINE

## 2023-12-29 PROCEDURE — 71260 CT THORAX DX C+: CPT | Performed by: RADIOLOGY

## 2023-12-29 RX ADMIN — IOPAMIDOL 75 ML: 755 INJECTION, SOLUTION INTRAVENOUS at 09:15

## 2024-01-05 ENCOUNTER — TELEPHONE (OUTPATIENT)
Dept: PULMONOLOGY | Age: 64
End: 2024-01-05

## 2024-01-05 NOTE — TELEPHONE ENCOUNTER
TRIAGE CALL      Complaint: cough/congestion   Cough: yes  Productive:  yes/yellow  Bloody Sputum:  no  Increased SOB/Wheezing:  no  Duration: last saturday   Fever/Chills: no  OTC Meds tried: no        Says she cannot work this way . She has lost weight. Does not feel good.

## 2024-01-05 NOTE — TELEPHONE ENCOUNTER
Able to work in 1/8 w/ other NP. Advised patient to resist reading CT report and have provider review with her.

## 2024-01-05 NOTE — TELEPHONE ENCOUNTER
Last seen: 10/23/23  Hx: chronic coughing, multiple episodes of PNA  Abnormal CT    Started symbicort, Immune deficiency testing, flutter valve.    Patient call reporting ongoing coughing & congestion, yellow sputum, no fever or chills, noting this is posing a challenge to her being able to work, causing weight loss and overall does not feel good.  Contacted patient regarding issues w/ coughing & congestion, recalls history of multiple meds but coughing since September. Coughing is causing a lot of other issues, namely back muscular issues causing her to be able to work. Unable to gain weight & build strength. Sputum varies [place], to yellow green.  Gets a rash over her entire body every time she has used Symbicort.   Her job is very physical job that is impeding options to work.   Sob is not major issue, so doesn't really need or use albuterol.  CT scan on Friday as a follow up, hasn't been able to get report yet. (Looks like uploaded today)  Expresses frustration with current state, feeling very tired and afraid. Working on seeing PCP to get put on FMLA leave to protect her job.  Notes prolific amounts of sputum. Flutter valve does help.

## 2024-01-06 ENCOUNTER — HOSPITAL ENCOUNTER (EMERGENCY)
Age: 64
Discharge: HOME | End: 2024-01-06
Attending: EMERGENCY MEDICINE
Payer: COMMERCIAL

## 2024-01-06 ENCOUNTER — APPOINTMENT (OUTPATIENT)
Dept: CT IMAGING | Age: 64
End: 2024-01-06
Payer: COMMERCIAL

## 2024-01-06 VITALS
OXYGEN SATURATION: 95 % | HEART RATE: 83 BPM | BODY MASS INDEX: 19.29 KG/M2 | DIASTOLIC BLOOD PRESSURE: 71 MMHG | HEIGHT: 66 IN | RESPIRATION RATE: 16 BRPM | TEMPERATURE: 98.4 F | WEIGHT: 120 LBS | SYSTOLIC BLOOD PRESSURE: 129 MMHG

## 2024-01-06 DIAGNOSIS — R05.1 ACUTE COUGH: ICD-10-CM

## 2024-01-06 DIAGNOSIS — K59.00 CONSTIPATION, UNSPECIFIED CONSTIPATION TYPE: Primary | ICD-10-CM

## 2024-01-06 LAB
ALBUMIN SERPL-MCNC: 3.9 G/DL (ref 3.2–4.6)
ALBUMIN/GLOB SERPL: 1 (ref 0.4–1.6)
ALP SERPL-CCNC: 95 U/L (ref 50–136)
ALT SERPL-CCNC: 19 U/L (ref 12–65)
ANION GAP SERPL CALC-SCNC: 4 MMOL/L (ref 2–11)
APPEARANCE UR: CLEAR
AST SERPL-CCNC: 14 U/L (ref 15–37)
BACTERIA URNS QL MICRO: NEGATIVE /HPF
BASOPHILS # BLD: 0.1 K/UL (ref 0–0.2)
BASOPHILS NFR BLD: 1 % (ref 0–2)
BILIRUB SERPL-MCNC: 0.7 MG/DL (ref 0.2–1.1)
BILIRUB UR QL: NEGATIVE
BUN SERPL-MCNC: 12 MG/DL (ref 8–23)
CALCIUM SERPL-MCNC: 9.7 MG/DL (ref 8.3–10.4)
CASTS URNS QL MICRO: ABNORMAL /LPF
CHLORIDE SERPL-SCNC: 104 MMOL/L (ref 103–113)
CO2 SERPL-SCNC: 29 MMOL/L (ref 21–32)
COLOR UR: ABNORMAL
CREAT SERPL-MCNC: 0.9 MG/DL (ref 0.6–1)
DIFFERENTIAL METHOD BLD: ABNORMAL
EOSINOPHIL # BLD: 0.1 K/UL (ref 0–0.8)
EOSINOPHIL NFR BLD: 1 % (ref 0.5–7.8)
EPI CELLS #/AREA URNS HPF: ABNORMAL /HPF
ERYTHROCYTE [DISTWIDTH] IN BLOOD BY AUTOMATED COUNT: 12.7 % (ref 11.9–14.6)
GLOBULIN SER CALC-MCNC: 4 G/DL (ref 2.8–4.5)
GLUCOSE SERPL-MCNC: 102 MG/DL (ref 65–100)
GLUCOSE UR STRIP.AUTO-MCNC: NEGATIVE MG/DL
HCT VFR BLD AUTO: 43.7 % (ref 35.8–46.3)
HGB BLD-MCNC: 14.1 G/DL (ref 11.7–15.4)
HGB UR QL STRIP: ABNORMAL
IMM GRANULOCYTES # BLD AUTO: 0 K/UL (ref 0–0.5)
IMM GRANULOCYTES NFR BLD AUTO: 0 % (ref 0–5)
KETONES UR QL STRIP.AUTO: NEGATIVE MG/DL
LACTATE SERPL-SCNC: 0.8 MMOL/L (ref 0.4–2)
LEUKOCYTE ESTERASE UR QL STRIP.AUTO: NEGATIVE
LIPASE SERPL-CCNC: 67 U/L (ref 73–393)
LYMPHOCYTES # BLD: 2.1 K/UL (ref 0.5–4.6)
LYMPHOCYTES NFR BLD: 15 % (ref 13–44)
MCH RBC QN AUTO: 28.7 PG (ref 26.1–32.9)
MCHC RBC AUTO-ENTMCNC: 32.3 G/DL (ref 31.4–35)
MCV RBC AUTO: 88.8 FL (ref 82–102)
MONOCYTES # BLD: 0.7 K/UL (ref 0.1–1.3)
MONOCYTES NFR BLD: 5 % (ref 4–12)
MUCOUS THREADS URNS QL MICRO: 0 /LPF
NEUTS SEG # BLD: 11.5 K/UL (ref 1.7–8.2)
NEUTS SEG NFR BLD: 78 % (ref 43–78)
NITRITE UR QL STRIP.AUTO: NEGATIVE
NRBC # BLD: 0 K/UL (ref 0–0.2)
PH UR STRIP: 5.5 (ref 5–9)
PLATELET # BLD AUTO: 293 K/UL (ref 150–450)
PMV BLD AUTO: 9.3 FL (ref 9.4–12.3)
POTASSIUM SERPL-SCNC: 3.6 MMOL/L (ref 3.5–5.1)
PROCALCITONIN SERPL-MCNC: <0.05 NG/ML (ref 0–0.49)
PROT SERPL-MCNC: 7.9 G/DL (ref 6.3–8.2)
PROT UR STRIP-MCNC: NEGATIVE MG/DL
RBC # BLD AUTO: 4.92 M/UL (ref 4.05–5.2)
RBC #/AREA URNS HPF: ABNORMAL /HPF
SODIUM SERPL-SCNC: 137 MMOL/L (ref 136–146)
SP GR UR REFRACTOMETRY: 1.01 (ref 1–1.02)
URINE CULTURE IF INDICATED: ABNORMAL
UROBILINOGEN UR QL STRIP.AUTO: 0.2 EU/DL (ref 0.2–1)
WBC # BLD AUTO: 14.5 K/UL (ref 4.3–11.1)
WBC URNS QL MICRO: ABNORMAL /HPF

## 2024-01-06 PROCEDURE — 84145 PROCALCITONIN (PCT): CPT

## 2024-01-06 PROCEDURE — 83605 ASSAY OF LACTIC ACID: CPT

## 2024-01-06 PROCEDURE — 74177 CT ABD & PELVIS W/CONTRAST: CPT

## 2024-01-06 PROCEDURE — 85025 COMPLETE CBC W/AUTO DIFF WBC: CPT

## 2024-01-06 PROCEDURE — 81001 URINALYSIS AUTO W/SCOPE: CPT

## 2024-01-06 PROCEDURE — 2580000003 HC RX 258: Performed by: EMERGENCY MEDICINE

## 2024-01-06 PROCEDURE — 83690 ASSAY OF LIPASE: CPT

## 2024-01-06 PROCEDURE — 99285 EMERGENCY DEPT VISIT HI MDM: CPT

## 2024-01-06 PROCEDURE — 6360000004 HC RX CONTRAST MEDICATION: Performed by: EMERGENCY MEDICINE

## 2024-01-06 PROCEDURE — 80053 COMPREHEN METABOLIC PANEL: CPT

## 2024-01-06 RX ORDER — 0.9 % SODIUM CHLORIDE 0.9 %
1000 INTRAVENOUS SOLUTION INTRAVENOUS
Status: COMPLETED | OUTPATIENT
Start: 2024-01-06 | End: 2024-01-06

## 2024-01-06 RX ORDER — POLYETHYLENE GLYCOL 3350 17 G/17G
17 POWDER, FOR SOLUTION ORAL 2 TIMES DAILY
Qty: 225 G | Refills: 0 | Status: SHIPPED | OUTPATIENT
Start: 2024-01-06 | End: 2024-01-09

## 2024-01-06 RX ORDER — CEFDINIR 300 MG/1
300 CAPSULE ORAL 2 TIMES DAILY
Qty: 14 CAPSULE | Refills: 0 | Status: SHIPPED | OUTPATIENT
Start: 2024-01-06 | End: 2024-01-13

## 2024-01-06 RX ADMIN — IOPAMIDOL 100 ML: 755 INJECTION, SOLUTION INTRAVENOUS at 21:32

## 2024-01-06 RX ADMIN — SODIUM CHLORIDE 1000 ML: 9 INJECTION, SOLUTION INTRAVENOUS at 20:05

## 2024-01-06 ASSESSMENT — ENCOUNTER SYMPTOMS
VOMITING: 0
ABDOMINAL PAIN: 1
DIARRHEA: 0
NAUSEA: 0
RHINORRHEA: 0
CONSTIPATION: 0
BACK PAIN: 0
COUGH: 1
SHORTNESS OF BREATH: 0
COLOR CHANGE: 0
SORE THROAT: 0

## 2024-01-06 ASSESSMENT — PAIN DESCRIPTION - ORIENTATION: ORIENTATION: RIGHT

## 2024-01-06 ASSESSMENT — LIFESTYLE VARIABLES
HOW OFTEN DO YOU HAVE A DRINK CONTAINING ALCOHOL: NEVER
HOW MANY STANDARD DRINKS CONTAINING ALCOHOL DO YOU HAVE ON A TYPICAL DAY: PATIENT DOES NOT DRINK

## 2024-01-06 ASSESSMENT — PAIN DESCRIPTION - DESCRIPTORS: DESCRIPTORS: ACHING;PRESSURE

## 2024-01-06 ASSESSMENT — PAIN - FUNCTIONAL ASSESSMENT: PAIN_FUNCTIONAL_ASSESSMENT: 0-10

## 2024-01-06 ASSESSMENT — PAIN SCALES - GENERAL: PAINLEVEL_OUTOF10: 4

## 2024-01-06 ASSESSMENT — PAIN DESCRIPTION - LOCATION: LOCATION: ABDOMEN

## 2024-01-07 NOTE — PROGRESS NOTES
medical treatment followed by follow-up CT is advised.  *  Stable pulmonary opacities compared to prior CT chest on 12/29/2023 as  detailed above, likely infectious in etiology. Continued follow-up to resolution  is advised.      If there are any questions about this report, I can be reached on [employer]  or at [phone]                            Nuclear Medicine: No results found for this or any previous visit from the past 3650 days.      PFTs:       Latest Ref Rng & Units 9/14/2023    12:00 AM   Office Spirometry Results   FVC L 1.93  P   FEV1 L 1.45  P   FEV1 %Pred-Pre % 53  P   FVC %Pred-Pre % 55  P   FEV1/FVC % 75  P      P Preliminary result     No results found for this or any previous visit. No results found for this or any previous visit.    FeNO: No results found for this or any previous visit.  FeNO and Likelihood of Eosinophilic Asthma   Unlikely Intermediate Likely   <25 ppb 25-50 ppb >50ppb     Exercise Oximetry:    Echo: No results found for this or any previous visit from the past 3650 days.      [facility] Reference Info:                                                                                                                    There is no immunization history on file for this patient.  Past Medical History:   Diagnosis Date    Slow to wake up after anesthesia         Tobacco Use      Smoking status: Never      Smokeless tobacco: Never    Allergies   Allergen Reactions    Amoxicillin Hives    Augmentin [Amoxicillin-Pot Clavulanate] Hives    Soy      Other reaction(s): Rash-Allergy    Wheat      Other reaction(s): Rash-Allergy    Prednisone Rash     Other reaction(s): Hives/Swelling-Allergy  Pt stated allergic to steroids     Current Outpatient Medications   Medication Instructions    albuterol sulfate HFA (PROVENTIL;VENTOLIN;PROAIR) 108 (90 Base) MCG/ACT inhaler INHALE 2 PUFFS EVERY 4 TO 6 HOURS AS NEEDED FOR SHORTNESS OF BREATH OR FOR WHEEZE    Ascorbic Acid (VITAMIN C) 1000 MG tablet 1 tab(s)

## 2024-01-07 NOTE — ED PROVIDER NOTES
Emergency Department Provider Note       PCP: Jennifer Garcia MD   Age: 63 y.o.   Sex: female     DISPOSITION Decision To Discharge 01/06/2024 10:29:11 PM       ICD-10-CM    1. Constipation, unspecified constipation type  K59.00       2. Acute cough  R05.1           Medical Decision Making     Complexity of Problems Addressed:  1 or more acute illnesses that pose a threat to life or bodily function.     Data Reviewed and Analyzed:   I independently ordered and reviewed each unique test.  I reviewed external records: provider visit note from outside specialist.       I independently ordered and interpreted the ED       I interpreted the CT Scan no free air.    Discussion of management or test interpretation.  Patient with recent lung infection.  Still some improvement but has return of cough.  CT shows some pulmonary opacities possibly infectious.  Will restart patient cefdinir.  Abdominal pain likely due to constipation on CAT scan.  In the right flank and CVA area.  No UTI.  Patient feeling better currently.  Will discharge with MiraLAX and outpatient follow-up      Risk of Complications and/or Morbidity of Patient Management:  Prescription drug management performed.  Patient was discharged risks and benefits of hospitalization were considered.  Shared medical decision making was utilized in creating the patients health plan today.         Is this patient to be included in the SEP-1 core measure due to severe sepsis or septic shock? No Exclusion criteria - the patient is NOT to be included for SEP-1 Core Measure due to: Infection is not suspected      History      Patient with recent tree-in-bud lung infection diagnosed as haemophilus influenza on bronchoscopy.  She had follow-up with her PCP and repeat CAT scan 1 week ago.  She has mostly improvement throughout the lungs with a new spot on the left small in nature.  After the CT scan she developed some lower back pain worse on the right.  Now focused on the right

## 2024-01-07 NOTE — ED NOTES
I have reviewed discharge instructions with the patient.  The patient verbalized understanding.    Patient left ED via Discharge Method: ambulatory to Home with self.    Opportunity for questions and clarification provided.       Patient given 2 scripts.         To continue your aftercare when you leave the hospital, you may receive an automated call from our care team to check in on how you are doing.  This is a free service and part of our promise to provide the best care and service to meet your aftercare needs.” If you have questions, or wish to unsubscribe from this service please call [phone].  Thank you for Choosing our [facility] Emergency Department.        Irene Thornton, RN  01/06/24 1508

## 2024-01-07 NOTE — ED TRIAGE NOTES
Patient to ER with c/o of RLQ pain that has been going on for about a week that has progressively gotten worse and patient has not been able to go to work. Pt also states she has had a f/u CT scan that showed a bacterial infection in her lung that has not been treated yet as the scan has just resulted from a f/u from September

## 2024-01-08 ENCOUNTER — OFFICE VISIT (OUTPATIENT)
Dept: PULMONOLOGY | Age: 64
End: 2024-01-08
Payer: COMMERCIAL

## 2024-01-08 VITALS
HEIGHT: 67 IN | RESPIRATION RATE: 18 BRPM | DIASTOLIC BLOOD PRESSURE: 76 MMHG | WEIGHT: 122.4 LBS | HEART RATE: 75 BPM | TEMPERATURE: 97.2 F | OXYGEN SATURATION: 95 % | BODY MASS INDEX: 19.21 KG/M2 | SYSTOLIC BLOOD PRESSURE: 136 MMHG

## 2024-01-08 DIAGNOSIS — R91.8 PULMONARY INFILTRATE: ICD-10-CM

## 2024-01-08 DIAGNOSIS — R91.8 ABNORMAL CT SCAN OF LUNG: Primary | ICD-10-CM

## 2024-01-08 PROCEDURE — 99215 OFFICE O/P EST HI 40 MIN: CPT | Performed by: NURSE PRACTITIONER

## 2024-01-08 RX ORDER — SODIUM CHLORIDE FOR INHALATION 3 %
4 VIAL, NEBULIZER (ML) INHALATION 3 TIMES DAILY
Qty: 360 ML | Refills: 3 | Status: SHIPPED | OUTPATIENT
Start: 2024-01-08

## 2024-01-08 RX ORDER — CEFDINIR 300 MG/1
300 CAPSULE ORAL 2 TIMES DAILY
Qty: 20 CAPSULE | Refills: 0 | Status: SHIPPED | OUTPATIENT
Start: 2024-01-08 | End: 2024-01-18

## 2024-01-08 RX ORDER — BENZONATATE 200 MG/1
200 CAPSULE ORAL 3 TIMES DAILY PRN
Qty: 90 CAPSULE | Refills: 3 | Status: SHIPPED | OUTPATIENT
Start: 2024-01-08 | End: 2024-05-07

## 2024-01-08 NOTE — PATIENT INSTRUCTIONS
Tessalon perles 200 mg 3 times a day    Use nebulizer with saline three times day followed by flutter valve    Omnicef twice daily for a total of 14 days

## 2024-01-23 ENCOUNTER — OFFICE VISIT (OUTPATIENT)
Dept: PULMONOLOGY | Age: 64
End: 2024-01-23
Payer: COMMERCIAL

## 2024-01-23 VITALS
HEART RATE: 92 BPM | SYSTOLIC BLOOD PRESSURE: 116 MMHG | WEIGHT: 122 LBS | BODY MASS INDEX: 19.15 KG/M2 | RESPIRATION RATE: 19 BRPM | DIASTOLIC BLOOD PRESSURE: 76 MMHG | TEMPERATURE: 97.4 F | HEIGHT: 67 IN | OXYGEN SATURATION: 94 %

## 2024-01-23 DIAGNOSIS — R06.02 SOB (SHORTNESS OF BREATH): ICD-10-CM

## 2024-01-23 DIAGNOSIS — R91.8 PULMONARY INFILTRATE: Primary | ICD-10-CM

## 2024-01-23 DIAGNOSIS — Z86.19 FREQUENT INFECTIONS: ICD-10-CM

## 2024-01-23 DIAGNOSIS — A49.2 HEMOPHILUS INFLUENZAE INFECTION, UNSPECIFIED SITE: ICD-10-CM

## 2024-01-23 DIAGNOSIS — J21.9 BRONCHIOLITIS: ICD-10-CM

## 2024-01-23 PROCEDURE — 99215 OFFICE O/P EST HI 40 MIN: CPT | Performed by: NURSE PRACTITIONER

## 2024-01-23 RX ORDER — NITROFURANTOIN 25; 75 MG/1; MG/1
100 CAPSULE ORAL 2 TIMES DAILY
Qty: 20 CAPSULE | Refills: 0 | Status: SHIPPED | OUTPATIENT
Start: 2024-01-23 | End: 2024-02-02

## 2024-01-23 RX ORDER — ALBUTEROL SULFATE AND BUDESONIDE 90; 80 UG/1; UG/1
2 AEROSOL, METERED RESPIRATORY (INHALATION) EVERY 6 HOURS PRN
Qty: 1 G | Refills: 5 | Status: SHIPPED | OUTPATIENT
Start: 2024-01-23

## 2024-01-23 NOTE — PROGRESS NOTES
Name:  Pablo Rodirguez  YOB: 1960   MRN: [id number]      Office Visit: 1/23/2024        ASSESSMENT AND PLAN:  (Medical Decision Making)    Impression: 63 y.o. female with a history of multiple bouts of \"pneumonia and pneumonitis\" with chronic cough during these episodes that seem to be precipitated by mold exposure.    1. Bronchiolitis  --tree and bud findings on CT Status post bronchoscopy with findings of H. influenzae on cultures.  Negative AFB, cytology.  Started on Augmentin and had a reaction so changed to cefdinir, which was extended to a total of 14 days.    --today feeling some better.  Appetite some improved, sputum thick, but white.  --try airsupra, coupon card given.  --immune deficiency testing negative.    --discussed case with Dr Forte and based on images, could be diffuse pan bronchiolitis.    --will check comprehensive VANI and cold agglutinin titer.    2. Abnormal CT scan of lung  --will continue to monitor this. Will need repeat CT at some point.    Pt has a concern for UTI, so gave macrobid for that.     Orders Placed This Encounter   Medications    nitrofurantoin, macrocrystal-monohydrate, (MACROBID) 100 MG capsule     Sig: Take 1 capsule by mouth 2 times daily for 10 days     Dispense:  20 capsule     Refill:  0    Albuterol-Budesonide (AIRSUPRA) 90-80 MCG/ACT AERO     Sig: Inhale 2 puffs into the lungs every 6 hours as needed (wheezing, cough, shortness of breath, chest tightness.)     Dispense:  1 g     Refill:  5     No orders of the defined types were placed in this encounter.      TING Watson - CNP    Collaborating physician is Mimi Forte MD    Total time for encounter on day of encounter was 48 minutes.  This time includes chart prep, review of tests/procedures, review of other provider's notes, documentation and counseling patient regarding disease process and medications.

## 2024-01-26 ENCOUNTER — TELEPHONE (OUTPATIENT)
Dept: PULMONOLOGY | Age: 64
End: 2024-01-26

## 2024-01-26 DIAGNOSIS — Z86.19 FREQUENT INFECTIONS: ICD-10-CM

## 2024-01-26 DIAGNOSIS — R91.8 PULMONARY INFILTRATE: ICD-10-CM

## 2024-01-26 DIAGNOSIS — J21.9 BRONCHIOLITIS: ICD-10-CM

## 2024-01-26 LAB
BASOPHILS # BLD: 0.1 K/UL (ref 0–0.2)
BASOPHILS NFR BLD: 1 % (ref 0–2)
DIFFERENTIAL METHOD BLD: NORMAL
EOSINOPHIL # BLD: 0.3 K/UL (ref 0–0.8)
EOSINOPHIL NFR BLD: 6 % (ref 0.5–7.8)
ERYTHROCYTE [DISTWIDTH] IN BLOOD BY AUTOMATED COUNT: 13.1 % (ref 11.9–14.6)
HCT VFR BLD AUTO: 43 % (ref 35.8–46.3)
HGB BLD-MCNC: 13.7 G/DL (ref 11.7–15.4)
IMM GRANULOCYTES # BLD AUTO: 0 K/UL (ref 0–0.5)
IMM GRANULOCYTES NFR BLD AUTO: 0 % (ref 0–5)
LYMPHOCYTES # BLD: 1.3 K/UL (ref 0.5–4.6)
LYMPHOCYTES NFR BLD: 23 % (ref 13–44)
MCH RBC QN AUTO: 29.1 PG (ref 26.1–32.9)
MCHC RBC AUTO-ENTMCNC: 31.9 G/DL (ref 31.4–35)
MCV RBC AUTO: 91.3 FL (ref 82–102)
MONOCYTES # BLD: 0.4 K/UL (ref 0.1–1.3)
MONOCYTES NFR BLD: 8 % (ref 4–12)
NEUTS SEG # BLD: 3.5 K/UL (ref 1.7–8.2)
NEUTS SEG NFR BLD: 62 % (ref 43–78)
NRBC # BLD: 0 K/UL (ref 0–0.2)
PLATELET # BLD AUTO: 231 K/UL (ref 150–450)
PMV BLD AUTO: 10.2 FL (ref 9.4–12.3)
RBC # BLD AUTO: 4.71 M/UL (ref 4.05–5.2)
WBC # BLD AUTO: 5.6 K/UL (ref 4.3–11.1)

## 2024-01-26 NOTE — TELEPHONE ENCOUNTER
I called and spoke with patient to let her know that we faxed disability paper to company. I also left her a copy at the .. martha dotson

## 2024-01-28 LAB
CENTROMERE B AB SER-ACNC: <0.2 AI (ref 0–0.9)
CHROMATIN AB SERPL-ACNC: <0.2 AI (ref 0–0.9)
DSDNA AB SER-ACNC: 3 IU/ML (ref 0–9)
ENA JO1 AB SER-ACNC: <0.2 AI (ref 0–0.9)
ENA RNP AB SER-ACNC: <0.2 AI (ref 0–0.9)
ENA SCL70 AB SER-ACNC: <0.2 AI (ref 0–0.9)
ENA SM AB SER-ACNC: 0.2 AI (ref 0–0.9)
ENA SS-A AB SER-ACNC: <0.2 AI (ref 0–0.9)
ENA SS-B AB SER-ACNC: <0.2 AI (ref 0–0.9)
Lab: NORMAL

## 2024-01-29 ENCOUNTER — TELEPHONE (OUTPATIENT)
Dept: PULMONOLOGY | Age: 64
End: 2024-01-29

## 2024-01-29 NOTE — TELEPHONE ENCOUNTER
PA for Airsupra sent to [facility], via CoverMyMeds. Key: [id number]    This request has been approved  Authorized from January 31, 2024 to January 31, 2025

## 2024-01-30 LAB — CA TITR SERPL AGGL: NEGATIVE

## 2024-02-07 ENCOUNTER — TELEPHONE (OUTPATIENT)
Dept: PULMONOLOGY | Age: 64
End: 2024-02-07

## 2024-02-07 DIAGNOSIS — J18.9 RECURRENT PNEUMONIA: ICD-10-CM

## 2024-02-07 DIAGNOSIS — R05.3 CHRONIC COUGH: Primary | ICD-10-CM

## 2024-02-07 NOTE — TELEPHONE ENCOUNTER
TRIAGE CALL      Complaint: CoughingSOB  Cough: yes  Productive:  yes  Bloody Sputum:  no  Increased SOB/Wheezing:  yes  Duration: 6 months  Fever/Chills: yes  OTC Meds tried: Azo

## 2024-02-07 NOTE — TELEPHONE ENCOUNTER
Last seen: 1/23/24  Hx: bronchiolitis, abnormal CT    Last visit was f/u for recurrent PNAs, chronic cough & sob; overall improved, f/u planned for 8 weeks (mid March).  Patient call reporting coughing & sob for the last 6 months.   Contacted patient, patient reporting that she has gotten worse since last appt, she went back to work but having significantly more coughing even when not at work. Took tessalon last night, airsupra doesn't do anything, had allergy testing Monday AM (@ [facility]) and was told no allergies, at that appt blew into a machine & had a measurement of 63%, noted he could hear that she wasn't getting her breath.  Noting extreme fatigue due to coughing; feels like coughing is the same but deeper.   Has been using airsupra BID, started 1/27 used until Monday. Wants to know if she should start back on nebulizer?  Notes cannot tolerate albuterol due to headache.     Last course of antibiotic was cefdinir and entire course was taken. Asking if she needs to take another course of antibiotic.   Asking for anything to help relieve coughing and hopefully find a permanent solution.

## 2024-02-07 NOTE — TELEPHONE ENCOUNTER
On her bronch she grew h flu. If she is coughing up any purulent sputum would try to get a bacterial and afb culture. In the meantime would give levaquin 500mg x 7 days for presumed recurrent infection. Would try to get a follow up appointment with mario or nelly to review further.     Vin Everett MD

## 2024-02-08 RX ORDER — LEVOFLOXACIN 500 MG/1
500 TABLET, FILM COATED ORAL DAILY
Qty: 7 TABLET | Refills: 0 | Status: SHIPPED | OUTPATIENT
Start: 2024-02-08

## 2024-02-08 NOTE — TELEPHONE ENCOUNTER
Relayed MD recommendations. Patient will  sputum cup & lab slip today. Then start antibiotic after submitting specimen.

## 2024-02-09 DIAGNOSIS — J18.9 RECURRENT PNEUMONIA: ICD-10-CM

## 2024-02-09 DIAGNOSIS — R05.3 CHRONIC COUGH: ICD-10-CM

## 2024-02-11 LAB
ACID FAST STN SPEC: NEGATIVE
SPECIMEN PREPARATION: NORMAL
SPECIMEN SOURCE: NORMAL

## 2024-02-13 LAB
BACTERIA SPEC CULT: ABNORMAL
BACTERIA SPEC CULT: ABNORMAL
GRAM STN SPEC: ABNORMAL
SERVICE CMNT-IMP: ABNORMAL

## 2024-03-20 ENCOUNTER — OFFICE VISIT (OUTPATIENT)
Dept: PULMONOLOGY | Age: 64
End: 2024-03-20
Payer: COMMERCIAL

## 2024-03-20 VITALS
RESPIRATION RATE: 20 BRPM | HEART RATE: 61 BPM | DIASTOLIC BLOOD PRESSURE: 80 MMHG | TEMPERATURE: 98 F | BODY MASS INDEX: 18.94 KG/M2 | OXYGEN SATURATION: 99 % | WEIGHT: 125 LBS | SYSTOLIC BLOOD PRESSURE: 120 MMHG | HEIGHT: 68 IN

## 2024-03-20 DIAGNOSIS — J18.9 RECURRENT PNEUMONIA: ICD-10-CM

## 2024-03-20 DIAGNOSIS — J21.9 BRONCHIOLITIS: Primary | ICD-10-CM

## 2024-03-20 DIAGNOSIS — R91.8 ABNORMAL CT SCAN OF LUNG: ICD-10-CM

## 2024-03-20 PROCEDURE — 99214 OFFICE O/P EST MOD 30 MIN: CPT | Performed by: NURSE PRACTITIONER

## 2024-03-20 RX ORDER — MILK THISTLE 150 MG
CAPSULE ORAL
COMMUNITY

## 2024-03-20 RX ORDER — PERPHENAZINE 8 MG
TABLET ORAL
COMMUNITY

## 2024-03-20 NOTE — PROGRESS NOTES
Name:  Pablo Rodriguez  YOB: 1960   MRN: [id number]      Office Visit: 3/20/2024        ASSESSMENT AND PLAN:  (Medical Decision Making)    Impression: 63 y.o. female with a history of multiple bouts of \"pneumonia and pneumonitis\" with chronic cough during these episodes that seem to be precipitated by mold exposure.    1. Bronchiolitis  --Much improved after Levaquin dosing, air supra, NAC.    --tree and bud findings on CT Status post bronchoscopy with findings of H. influenzae on cultures.  Negative AFB, cytology.    --immune deficiency testing negative, but saw Cool and agreed for concerns of immune deficiency.  unfortunately, patient did not do testing yet due to funds.  Encouraged at some point to consider for future reference.  -- comprehensive VANI and cold agglutinin titer checked for concerns of pan bronchiolitis, these were negative.    2. Abnormal CT scan of lung      No orders of the defined types were placed in this encounter.    No orders of the defined types were placed in this encounter.      TING Watson - CNP    Collaborating physician is Prasad Rodriguez MD  _____________________________________________________________________  HISTORY OF PRESENT ILLNESS:    Ms. Pablo Rodriguez is a 63 y.o. female who is seen at [facility] today for  Pulmonary infiltrate and Shortness of Breath     Ms Rodriguez  is here today for follow-up visit related to recurrent pneumonias, chronic cough and shortness of breath.  She is now status post [facility] findings of H. influenzae on cultures.  Started treatment with Augmentin and initially got better and then had significant drug reaction.  Changed to Omnicef and overall feels like she did improve until about a week after antibiotics completed.  She again has cough, mucus production and pain in her left chest.  She does have wheezing as well.  She was seen at as a work in earlier this month and placed back on Omnicef due to her symptoms

## 2024-03-26 LAB
ACID FAST STN SPEC: NEGATIVE
MYCOBACTERIUM SPEC QL CULT: POSITIVE
SPECIMEN PREPARATION: ABNORMAL
SPECIMEN SOURCE: ABNORMAL

## 2024-03-27 LAB
MYCOBACTERIUM AVIUM COMPLEX: POSITIVE
MYCOBACTERIUM TUBERCULOSIS COMPLEX: NEGATIVE
OTHER: ABNORMAL
REFLEX ID BY MALDI: ABNORMAL
SOURCE: ABNORMAL
SUSCEPT TESTING: ABNORMAL

## 2024-04-03 LAB
ACID FAST STN SPEC: NEGATIVE
AMIKACIN ISLT MIC: ABNORMAL
CIPROFLOXACIN ISLT MIC: ABNORMAL
CLARITHRO ISLT MIC: ABNORMAL
CLOFAZIMINE: ABNORMAL
DOXYCYCLINE: ABNORMAL
LINEZOLID ISLT MIC: ABNORMAL
MICROORGANISM/AGENT SPEC: ABNORMAL
MINOCYCLINE: ABNORMAL
MOXIFLOXACIN ISLT MIC: ABNORMAL
MYCOBACTERIUM AVIUM COMPLEX: POSITIVE
MYCOBACTERIUM SPEC QL CULT: POSITIVE
MYCOBACTERIUM TUBERCULOSIS COMPLEX: NEGATIVE
OTHER: ABNORMAL
REFLEX ID BY MALDI: ABNORMAL
RIFABUTIN: ABNORMAL
RIFAMPIN ISLT MIC: ABNORMAL
SOURCE: ABNORMAL
SPECIMEN PREPARATION: ABNORMAL
SPECIMEN SOURCE: ABNORMAL
STREPTOMYCIN ISLT MIC: ABNORMAL
SUSCEPT TESTING: ABNORMAL
TRIMETHOPRIM/SULFA: ABNORMAL

## 2024-04-08 LAB
AMIKACIN ISLT MIC: ABNORMAL
CIPROFLOXACIN ISLT MIC: ABNORMAL
CLARITHRO ISLT MIC: ABNORMAL
CLOFAZIMINE: ABNORMAL
DOXYCYCLINE: ABNORMAL
LINEZOLID ISLT MIC: ABNORMAL
MICROORGANISM/AGENT SPEC: ABNORMAL
MINOCYCLINE: ABNORMAL
MOXIFLOXACIN ISLT MIC: ABNORMAL
RIFABUTIN: ABNORMAL
RIFAMPIN ISLT MIC: ABNORMAL
SPECIMEN SOURCE: ABNORMAL
STREPTOMYCIN ISLT MIC: ABNORMAL
TRIMETHOPRIM/SULFA: ABNORMAL

## 2024-04-15 ENCOUNTER — TELEPHONE (OUTPATIENT)
Dept: PULMONOLOGY | Age: 64
End: 2024-04-15

## 2024-04-15 DIAGNOSIS — A31.0 MYCOBACTERIUM AVIUM COMPLEX (HCC): Primary | ICD-10-CM

## 2024-04-15 RX ORDER — LEVOFLOXACIN 500 MG/1
500 TABLET, FILM COATED ORAL DAILY
Qty: 10 TABLET | Refills: 0 | Status: SHIPPED | OUTPATIENT
Start: 2024-04-15 | End: 2024-04-25

## 2024-04-15 NOTE — TELEPHONE ENCOUNTER
Lets check regular culture on sputum as well.  Levaquin worked the last time so we could do another 10 days of it, though would like the sputum submitted first.  Also, she can use the air supra more frequently to see if this helps prevent the needs for oral steroids.

## 2024-04-15 NOTE — TELEPHONE ENCOUNTER
----- Message from Haydee GloverTING - CNP sent at 4/12/2024  2:24 PM EDT -----  Called to discuss culture results with patient and check on her. Left message for her to call back.  MAC positive on sputum, not bronchial washings.  If symptoms persist, can recheck sputum to confirm.     I have spoken with patient.  She is aware of results of sputum culture per Mrs Glover. She is coughing yellow congestion.  She reports started as a cold and has settled in her chest. Starting to wheeze. No perceivable fever but will recheck.  No SOB. She can feels secretions rattling.  She is agreeable to repeat sputum testing for AFB.  Specimen cup and order at .    Mrs Glover-patient is aware that I would send this message to you in event you felt she needed steroids and/or antibiotic for current symptoms.  We have discussed that in event you did, she should produce sputum prior to starting. Please advise.

## 2024-04-15 NOTE — TELEPHONE ENCOUNTER
Attempted to call patient and left a message for a return call. Order for sputum culture also placed at  for .        Health Maintenance Due   Topic Date Due   • DTaP/Tdap/Td Vaccine (1 - Tdap) 04/24/1980   • Colorectal Cancer Screening-Colonoscopy  04/24/2019   • Shingles Vaccine (1 of 2) 04/24/2019   • Influenza Vaccine (1) 09/01/2019   • Cervical Cancer Screening  04/12/2020       Patient is { DUE PICK LIST:[id number]}

## 2024-04-15 NOTE — TELEPHONE ENCOUNTER
Patient returned my call. She is aware of order fro additional sputum test order and additional Levaquin per Mrs Glover.  Sent to preferred pharmacy and she will not start until she is able to get sputum test to lab.

## 2024-04-19 DIAGNOSIS — A31.0 MYCOBACTERIUM AVIUM COMPLEX (HCC): ICD-10-CM

## 2024-04-21 LAB
BACTERIA SPEC CULT: NORMAL
GRAM STN SPEC: NORMAL
SERVICE CMNT-IMP: NORMAL

## 2024-04-23 LAB
ACID FAST STN SPEC: NEGATIVE
SPECIMEN PREPARATION: NORMAL
SPECIMEN SOURCE: NORMAL

## 2024-05-16 LAB
ACID FAST STN SPEC: NEGATIVE
MYCOBACTERIUM AVIUM COMPLEX: POSITIVE
MYCOBACTERIUM SPEC QL CULT: POSITIVE
MYCOBACTERIUM TUBERCULOSIS COMPLEX: NEGATIVE
OTHER: ABNORMAL
REFLEX ID BY MALDI: ABNORMAL
SPECIMEN PREPARATION: ABNORMAL
SPECIMEN SOURCE: ABNORMAL
SUSCEPT TESTING: ABNORMAL

## 2024-05-20 ENCOUNTER — TELEMEDICINE (OUTPATIENT)
Dept: PULMONOLOGY | Age: 64
End: 2024-05-20
Payer: COMMERCIAL

## 2024-05-20 ENCOUNTER — TELEPHONE (OUTPATIENT)
Dept: PULMONOLOGY | Age: 64
End: 2024-05-20

## 2024-05-20 DIAGNOSIS — J21.9 BRONCHIOLITIS: ICD-10-CM

## 2024-05-20 DIAGNOSIS — A31.0 MYCOBACTERIUM AVIUM COMPLEX (HCC): Primary | ICD-10-CM

## 2024-05-20 DIAGNOSIS — J18.9 RECURRENT PNEUMONIA: ICD-10-CM

## 2024-05-20 PROCEDURE — 99441 PR PHYS/QHP TELEPHONE EVALUATION 5-10 MIN: CPT | Performed by: NURSE PRACTITIONER

## 2024-05-20 RX ORDER — ETHAMBUTOL HYDROCHLORIDE 400 MG/1
1400 TABLET, FILM COATED ORAL
Qty: 42 TABLET | Refills: 11 | OUTPATIENT
Start: 2024-05-27

## 2024-05-20 RX ORDER — AZITHROMYCIN 250 MG/1
500 TABLET, FILM COATED ORAL
Qty: 24 TABLET | Refills: 11 | OUTPATIENT
Start: 2024-05-20

## 2024-05-20 RX ORDER — RIFAMPIN 300 MG/1
600 CAPSULE ORAL
Qty: 24 CAPSULE | Refills: 11 | OUTPATIENT
Start: 2024-06-03 | End: 2025-05-05

## 2024-05-20 NOTE — PROGRESS NOTES
Name:  Pablo Rodriguez  YOB: 1960   MRN: [id number]      Office Visit: 5/20/2024      The patient is seen by synchronous (real-time) audio-video technology on Epic.     Consent:  The patient/healthcare decision maker is aware that this patient-initiated Telehealth encounter is a billable service, with coverage as determined by his insurance carrier. The patient is aware that he/she may receive a bill and has provided verbal consent to proceed: Yes     I was at [facility] while conducting this visit.    We discussed the expected course, resolution and complications of the diagnosis(es) in detail.  Medication risks, benefits, costs, interactions, and alternatives were discussed as indicated.  I advised him to contact the office if his condition worsens, changes or fails to improve as anticipated. He expressed understanding with the diagnosis(es) and plan.     Pursuant to the emergency declaration under the Fleming Act and the National Emergencies Act, 1135 waiver authority and the Coronavirus Preparedness and Response Supplemental Appropriations Act, this Virtual  Visit was conducted, with patient's consent, to reduce the patient's risk of exposure to COVID-19 and provide continuity of care for an established patient.     Services were provided through a video synchronous discussion virtually to substitute for in-person clinic visit.    Over 50% of today's office visit was spent in face to face time reviewing test results/records, prognosis, importance of compliance, education about disease process, benefits of medications, instructions for management of acute flare-ups, and follow up plans.  Total time spent was 9:50 minutes.      ASSESSMENT AND PLAN:  (Medical Decision Making)    Impression: 63 y.o. female with a history of multiple bouts of \"pneumonia and pneumonitis\" with chronic cough during these episodes that seem to be precipitated by mold exposure.  Now with 2 positive

## 2024-05-20 NOTE — TELEPHONE ENCOUNTER
----- Message from TING Watson - CNP sent at 5/15/2024  3:15 PM EDT -----  2nd sputum result with MAC.  CT imaging and symptoms reviewed with Dr Flynn and pt would qualify for treatment if wanted.  Called patient and left message to return call.    This is an organism that may be causing his recurrent symptoms. This typically requires months of antibiotics. Often we treat in a combined effort with our infectious disease experts. In this situation we would typically recommend:     1.  Check CMP, CBC, ECG, visual color discrimination testing, audiogram  2.  Arrange sputum cultures after 3 mos, 6 mos, 9mos of therapy.  3.  Monday, Wednesday & Friday regimen of:  azithro 500mg MWF  Ethambutol 25mg/kg MWF   Rifampin 600mg MWF    One new medication each week to help with tolerating.       We can arrange a telephone visit to further discuss.      I have spoken with patient.  She is aware of communication per Mrs Glover and is agreeable to VV appointment with her this afternoon at 3:20 for further discussion.  Order for lab work, 3, 6 and 9 month sputum tests placed in EPIC.  Azithro, Ethambutol and Rifampin orders pended to assist NP

## 2024-07-16 ENCOUNTER — TELEPHONE (OUTPATIENT)
Dept: PULMONOLOGY | Age: 64
End: 2024-07-16

## 2024-07-16 NOTE — TELEPHONE ENCOUNTER
Agree with COVID testing.  Would be overly cautious with silver inhalations.  If COVID testing is negative, would consider Levaquin dosing, 750mg daily for 7 days again as this worked for her in the past.

## 2024-07-16 NOTE — TELEPHONE ENCOUNTER
I have notified patient of communication per Mrs Glover. She will complete COVID test and will call office with result.  She is aware if negative, Mrs Glover has approved Levaquin.

## 2024-07-16 NOTE — TELEPHONE ENCOUNTER
LOV 5/20/2024 Coutu, bronchiolitis, tree and bud findings on CT Status post bronchoscopy with findings of H. influenzae on cultures.  Negative AFB from bronch, cytology.  2 positive AFB's on sputum.   Airsupra    I have spoken with patient.  Temp 103.3 yesterday that she reports is gone today.  She reports that she has been resting and feels better today.  No fever.  Coughing up light green.  Occasional wheezing and no SOB.  She checks SpO2 when asked and SpO2 is 94%  She is nebulizing colloidal silver per holistic practitioner 1-2 x daily and 3% sodium chloride BID.  She is taking Airsupra daily. She reports headache, clear sinus drainage.  Works in [employer] service so unknown if COVID exposure.  She will check home COVID test.  Chills yesterday. Left back ache that she reports happens when she gets lung infection. We have discussed PPA provider may not endorse colloidal silver for nebulization but will route to Mrs Glover for her insight and for other recommendation.

## 2024-07-16 NOTE — TELEPHONE ENCOUNTER
TRIAGE CALL      Complaint: Coughing up thick green mucus  Cough: yes  Productive:  yes  Bloody Sputum:  no  Increased SOB/Wheezing:  yes  Duration: 5 days  Fever/Chills: yes started yesterday  OTC Meds tried: none

## 2024-07-18 RX ORDER — LEVOFLOXACIN 750 MG/1
750 TABLET, FILM COATED ORAL DAILY
Qty: 7 TABLET | Refills: 0 | Status: SHIPPED | OUTPATIENT
Start: 2024-07-18 | End: 2024-07-18

## 2024-07-18 RX ORDER — LEVOFLOXACIN 750 MG/1
750 TABLET, FILM COATED ORAL DAILY
Qty: 7 TABLET | Refills: 0 | Status: SHIPPED | OUTPATIENT
Start: 2024-07-18 | End: 2024-07-25

## 2024-07-18 NOTE — TELEPHONE ENCOUNTER
Patient has returned call.  COVID test was negative.  She reports temp of 99.8.  Confirmed with Mrs Glover that she approves Levaquin at this time and she does.  Sent to preferred pharmacy. Patient will call office for no improvement.  She is aware that this office recommends Delsym for cough.

## 2024-08-01 ENCOUNTER — TELEPHONE (OUTPATIENT)
Dept: PULMONOLOGY | Age: 64
End: 2024-08-01

## 2024-08-05 NOTE — TELEPHONE ENCOUNTER
Returning call from the pt regarding her [facility] paper work for Ms. Haydee Glover , she can drop her paperwork anytime . She voices understanding. Makenzie BONNER

## 2025-03-05 ENCOUNTER — APPOINTMENT (OUTPATIENT)
Dept: CT IMAGING | Age: 65
End: 2025-03-05
Payer: COMMERCIAL

## 2025-03-05 ENCOUNTER — HOSPITAL ENCOUNTER (EMERGENCY)
Age: 65
Discharge: HOME | End: 2025-03-05
Payer: COMMERCIAL

## 2025-03-05 ENCOUNTER — APPOINTMENT (OUTPATIENT)
Dept: MRI IMAGING | Age: 65
End: 2025-03-05
Payer: COMMERCIAL

## 2025-03-05 VITALS
TEMPERATURE: 98.8 F | HEIGHT: 68 IN | BODY MASS INDEX: 20.46 KG/M2 | WEIGHT: 135 LBS | RESPIRATION RATE: 18 BRPM | OXYGEN SATURATION: 99 % | HEART RATE: 95 BPM | DIASTOLIC BLOOD PRESSURE: 64 MMHG | SYSTOLIC BLOOD PRESSURE: 107 MMHG

## 2025-03-05 DIAGNOSIS — R51.9 ACUTE NONINTRACTABLE HEADACHE, UNSPECIFIED HEADACHE TYPE: Primary | ICD-10-CM

## 2025-03-05 LAB
ALBUMIN SERPL-MCNC: 2.9 G/DL (ref 3.2–4.6)
ALBUMIN/GLOB SERPL: 0.7 (ref 1–1.9)
ALP SERPL-CCNC: 78 U/L (ref 35–104)
ALT SERPL-CCNC: 44 U/L (ref 8–45)
ANION GAP SERPL CALC-SCNC: 12 MMOL/L (ref 7–16)
AST SERPL-CCNC: 48 U/L (ref 15–37)
BILIRUB SERPL-MCNC: 2.2 MG/DL (ref 0–1.2)
BUN SERPL-MCNC: 16 MG/DL (ref 8–23)
CALCIUM SERPL-MCNC: 9.6 MG/DL (ref 8.8–10.2)
CHLORIDE SERPL-SCNC: 97 MMOL/L (ref 98–107)
CO2 SERPL-SCNC: 24 MMOL/L (ref 20–29)
CREAT SERPL-MCNC: 0.98 MG/DL (ref 0.6–1.1)
ERYTHROCYTE [DISTWIDTH] IN BLOOD BY AUTOMATED COUNT: 12.2 % (ref 11.9–14.6)
GLOBULIN SER CALC-MCNC: 4.5 G/DL (ref 2.3–3.5)
GLUCOSE SERPL-MCNC: 114 MG/DL (ref 70–99)
HCT VFR BLD AUTO: 39.6 % (ref 35.8–46.3)
HGB BLD-MCNC: 13.6 G/DL (ref 11.7–15.4)
MCH RBC QN AUTO: 30.4 PG (ref 26.1–32.9)
MCHC RBC AUTO-ENTMCNC: 34.3 G/DL (ref 31.4–35)
MCV RBC AUTO: 88.6 FL (ref 82–102)
NRBC # BLD: 0 K/UL (ref 0–0.2)
PLATELET # BLD AUTO: 152 K/UL (ref 150–450)
PMV BLD AUTO: 10 FL (ref 9.4–12.3)
POTASSIUM SERPL-SCNC: 4 MMOL/L (ref 3.5–5.1)
PROT SERPL-MCNC: 7.4 G/DL (ref 6.3–8.2)
RBC # BLD AUTO: 4.47 M/UL (ref 4.05–5.2)
SODIUM SERPL-SCNC: 132 MMOL/L (ref 136–145)
WBC # BLD AUTO: 13.9 K/UL (ref 4.3–11.1)

## 2025-03-05 PROCEDURE — 85027 COMPLETE CBC AUTOMATED: CPT

## 2025-03-05 PROCEDURE — 2580000003 HC RX 258

## 2025-03-05 PROCEDURE — 36415 COLL VENOUS BLD VENIPUNCTURE: CPT

## 2025-03-05 PROCEDURE — A9579 GAD-BASE MR CONTRAST NOS,1ML: HCPCS

## 2025-03-05 PROCEDURE — 70450 CT HEAD/BRAIN W/O DYE: CPT

## 2025-03-05 PROCEDURE — 96372 THER/PROPH/DIAG INJ SC/IM: CPT

## 2025-03-05 PROCEDURE — 99285 EMERGENCY DEPT VISIT HI MDM: CPT

## 2025-03-05 PROCEDURE — 80053 COMPREHEN METABOLIC PANEL: CPT

## 2025-03-05 PROCEDURE — 6360000004 HC RX CONTRAST MEDICATION

## 2025-03-05 PROCEDURE — 96374 THER/PROPH/DIAG INJ IV PUSH: CPT

## 2025-03-05 PROCEDURE — 70553 MRI BRAIN STEM W/O & W/DYE: CPT

## 2025-03-05 PROCEDURE — 6360000002 HC RX W HCPCS

## 2025-03-05 RX ORDER — BUTALBITAL, ACETAMINOPHEN AND CAFFEINE 50; 325; 40 MG/1; MG/1; MG/1
1 TABLET ORAL EVERY 4 HOURS PRN
Qty: 180 TABLET | Refills: 3 | Status: SHIPPED | OUTPATIENT
Start: 2025-03-05

## 2025-03-05 RX ORDER — CLOPIDOGREL BISULFATE 75 MG/1
75 TABLET ORAL DAILY
Qty: 30 TABLET | Refills: 0 | Status: SHIPPED | OUTPATIENT
Start: 2025-03-05

## 2025-03-05 RX ORDER — ASPIRIN 81 MG/1
81 TABLET ORAL DAILY
Qty: 90 TABLET | Refills: 0 | Status: SHIPPED | OUTPATIENT
Start: 2025-03-05 | End: 2025-03-05 | Stop reason: ALTCHOICE

## 2025-03-05 RX ORDER — KETOROLAC TROMETHAMINE 15 MG/ML
15 INJECTION, SOLUTION INTRAMUSCULAR; INTRAVENOUS ONCE
Status: COMPLETED | OUTPATIENT
Start: 2025-03-05 | End: 2025-03-05

## 2025-03-05 RX ORDER — 0.9 % SODIUM CHLORIDE 0.9 %
500 INTRAVENOUS SOLUTION INTRAVENOUS
Status: COMPLETED | OUTPATIENT
Start: 2025-03-05 | End: 2025-03-05

## 2025-03-05 RX ORDER — ASPIRIN 81 MG/1
81 TABLET ORAL DAILY
Qty: 90 TABLET | Refills: 0 | Status: SHIPPED | OUTPATIENT
Start: 2025-03-05

## 2025-03-05 RX ORDER — BUTALBITAL, ACETAMINOPHEN AND CAFFEINE 50; 325; 40 MG/1; MG/1; MG/1
1 TABLET ORAL EVERY 4 HOURS PRN
Qty: 180 TABLET | Refills: 0 | Status: SHIPPED | OUTPATIENT
Start: 2025-03-05 | End: 2025-03-05 | Stop reason: ALTCHOICE

## 2025-03-05 RX ORDER — CLOPIDOGREL BISULFATE 75 MG/1
75 TABLET ORAL DAILY
Qty: 30 TABLET | Refills: 0 | Status: SHIPPED | OUTPATIENT
Start: 2025-03-05 | End: 2025-03-05 | Stop reason: ALTCHOICE

## 2025-03-05 RX ORDER — PROCHLORPERAZINE EDISYLATE 5 MG/ML
5 INJECTION INTRAMUSCULAR; INTRAVENOUS ONCE
Status: COMPLETED | OUTPATIENT
Start: 2025-03-05 | End: 2025-03-05

## 2025-03-05 RX ADMIN — PROCHLORPERAZINE EDISYLATE 5 MG: 5 INJECTION INTRAMUSCULAR; INTRAVENOUS at 12:08

## 2025-03-05 RX ADMIN — SODIUM CHLORIDE 500 ML: 9 INJECTION, SOLUTION INTRAVENOUS at 12:18

## 2025-03-05 RX ADMIN — KETOROLAC TROMETHAMINE 15 MG: 15 INJECTION, SOLUTION INTRAMUSCULAR; INTRAVENOUS at 16:44

## 2025-03-05 RX ADMIN — GADOTERIDOL 12 ML: 279.3 INJECTION, SOLUTION INTRAVENOUS at 15:32

## 2025-03-05 RX ADMIN — KETOROLAC TROMETHAMINE 15 MG: 15 INJECTION, SOLUTION INTRAMUSCULAR; INTRAVENOUS at 12:09

## 2025-03-05 ASSESSMENT — PAIN DESCRIPTION - LOCATION: LOCATION: HEAD

## 2025-03-05 ASSESSMENT — PAIN DESCRIPTION - PAIN TYPE: TYPE: ACUTE PAIN

## 2025-03-05 ASSESSMENT — PAIN - FUNCTIONAL ASSESSMENT: PAIN_FUNCTIONAL_ASSESSMENT: 0-10

## 2025-03-05 ASSESSMENT — PAIN SCALES - GENERAL: PAINLEVEL_OUTOF10: 9

## 2025-03-05 NOTE — DISCHARGE INSTRUCTIONS
As discussed your workup in the emergency department was reassuring with no abnormalities found. Please follow up with attached referral for evaluation by neurology. If you have not heard from them by tomorrow, please call and make an appointment. Take all medicine as prescribed. If your symptoms worsen or change please return the emergency department.

## 2025-03-05 NOTE — ED NOTES
Patient mobility status  with mild difficulty.     I have reviewed discharge instructions with the patient.  The patient verbalized understanding.    Patient left ED via Discharge Method: ambulatory to Home with Spouse.    Opportunity for questions and clarification provided.     Patient given 3 scripts.              Diana Brewer RN  03/05/25 4410

## 2025-03-05 NOTE — ED TRIAGE NOTES
Pt 63 y/o female arrives to ED with a complaint of severe headache for about 3 weeks, pt states the headache got worse Sunday. Pt states pain radiates, as well has she has been experiencing tremors and no appetite.

## 2025-03-05 NOTE — ED PROVIDER NOTES
Emergency Department Provider Note       PCP: Jennifer Garcia MD   Age: 64 y.o.   Sex: female     DISPOSITION Decision To Discharge 03/05/2025 04:20:54 PM    ICD-10-CM    1. Acute nonintractable headache, unspecified headache type  R51.9 [facility]          Medical Decision Making     Patient is a 64-year-old female with a past medical history of MAC presenting to the emergency department for headache.  Physical exam is reassuring, neurological exam is intact.  There is no nuchal rigidity or cervical neck tenderness. Vision is grossly intact.  Given severity of headache and duration of time patient has experienced, will obtain CT of head.  Will obtain basic lab work to evaluate for dehydration as patient states that she has not been able to drink water for the past 3 weeks.    CBC and CMP is within normal limits. Leukocytosis of 13, patient does not meet SIRs criteria at this time.  Radiologist reached out regarding CT head, he states questionable diffuse cerebral edema and recommended MRI with and without contrast.  Discussed the need for MRI of neck, at this time he did not recommend.  Will obtain MRI of head.  Patient does confirm relief of headache with Toradol and Compazine provided.    Delay in patient receiving MRI.    MRI without acute abnormality, no evidence of cerebral edema.  Discussed case with attending, he recommended patient be started on Plavix and aspirin.  Will refer patient to neurology for further evaluation of headaches.  Will prescribe pain medicine for patient.  Given resolution of pain and intact neurological exam, she is suitable for outpatient follow-up at this time.  Patient was agreeable to treatment plan.  Strict return precautions provided, she verbalized understanding.     1 acute, uncomplicated illness or injury.  Prescription drug management performed.  Shared medical decision making was utilized in creating the patients health plan today.  I

## 2025-03-06 ENCOUNTER — APPOINTMENT (OUTPATIENT)
Dept: GENERAL RADIOLOGY | Age: 65
DRG: 871 | End: 2025-03-06
Payer: COMMERCIAL

## 2025-03-06 ENCOUNTER — HOSPITAL ENCOUNTER (INPATIENT)
Age: 65
LOS: 1 days | Discharge: HOME | DRG: 871 | End: 2025-03-08
Attending: EMERGENCY MEDICINE | Admitting: INTERNAL MEDICINE
Payer: COMMERCIAL

## 2025-03-06 DIAGNOSIS — J18.9 PNEUMONIA DUE TO INFECTIOUS ORGANISM, UNSPECIFIED LATERALITY, UNSPECIFIED PART OF LUNG: ICD-10-CM

## 2025-03-06 DIAGNOSIS — A41.9 SEPSIS, DUE TO UNSPECIFIED ORGANISM, UNSPECIFIED WHETHER ACUTE ORGAN DYSFUNCTION PRESENT (HCC): Primary | ICD-10-CM

## 2025-03-06 PROBLEM — N39.0 UTI (URINARY TRACT INFECTION): Status: ACTIVE | Noted: 2025-03-06

## 2025-03-06 PROBLEM — R74.01 TRANSAMINITIS: Status: ACTIVE | Noted: 2025-03-06

## 2025-03-06 PROBLEM — G43.909 MIGRAINE: Status: ACTIVE | Noted: 2025-03-06

## 2025-03-06 PROBLEM — R79.89 ELEVATED PROCALCITONIN: Status: ACTIVE | Noted: 2025-03-06

## 2025-03-06 LAB
ALBUMIN SERPL-MCNC: 2.8 G/DL (ref 3.2–4.6)
ALBUMIN/GLOB SERPL: 0.7 (ref 1–1.9)
ALP SERPL-CCNC: 126 U/L (ref 35–104)
ALT SERPL-CCNC: 79 U/L (ref 8–45)
ANION GAP SERPL CALC-SCNC: 15 MMOL/L (ref 7–16)
APPEARANCE UR: CLEAR
AST SERPL-CCNC: 76 U/L (ref 15–37)
BACTERIA URNS QL MICRO: ABNORMAL /HPF
BASOPHILS # BLD: 0.08 K/UL (ref 0–0.2)
BASOPHILS NFR BLD: 0.5 % (ref 0–2)
BILIRUB SERPL-MCNC: 1.6 MG/DL (ref 0–1.2)
BILIRUB UR QL: NEGATIVE
BUN SERPL-MCNC: 19 MG/DL (ref 8–23)
CALCIUM SERPL-MCNC: 9.2 MG/DL (ref 8.8–10.2)
CHLORIDE SERPL-SCNC: 96 MMOL/L (ref 98–107)
CO2 SERPL-SCNC: 23 MMOL/L (ref 20–29)
COLOR UR: ABNORMAL
CREAT SERPL-MCNC: 1 MG/DL (ref 0.6–1.1)
DIFFERENTIAL METHOD BLD: ABNORMAL
EOSINOPHIL # BLD: 0.03 K/UL (ref 0–0.8)
EOSINOPHIL NFR BLD: 0.2 % (ref 0.5–7.8)
EPI CELLS #/AREA URNS HPF: ABNORMAL /HPF
ERYTHROCYTE [DISTWIDTH] IN BLOOD BY AUTOMATED COUNT: 12.2 % (ref 11.9–14.6)
GLOBULIN SER CALC-MCNC: 3.8 G/DL (ref 2.3–3.5)
GLUCOSE SERPL-MCNC: 101 MG/DL (ref 70–99)
GLUCOSE UR STRIP.AUTO-MCNC: NEGATIVE MG/DL
HCT VFR BLD AUTO: 38.3 % (ref 35.8–46.3)
HGB BLD-MCNC: 13.3 G/DL (ref 11.7–15.4)
HGB UR QL STRIP: ABNORMAL
IMM GRANULOCYTES # BLD AUTO: 0.25 K/UL (ref 0–0.5)
IMM GRANULOCYTES NFR BLD AUTO: 1.5 % (ref 0–5)
KETONES UR QL STRIP.AUTO: 15 MG/DL
LACTATE SERPL-SCNC: 1.2 MMOL/L (ref 0.5–2)
LEUKOCYTE ESTERASE UR QL STRIP.AUTO: ABNORMAL
LYMPHOCYTES # BLD: 0.95 K/UL (ref 0.5–4.6)
LYMPHOCYTES NFR BLD: 5.6 % (ref 13–44)
MCH RBC QN AUTO: 30.4 PG (ref 26.1–32.9)
MCHC RBC AUTO-ENTMCNC: 34.7 G/DL (ref 31.4–35)
MCV RBC AUTO: 87.4 FL (ref 82–102)
MONOCYTES # BLD: 1.14 K/UL (ref 0.1–1.3)
MONOCYTES NFR BLD: 6.7 % (ref 4–12)
NEUTS SEG # BLD: 14.51 K/UL (ref 1.7–8.2)
NEUTS SEG NFR BLD: 85.5 % (ref 43–78)
NITRITE UR QL STRIP.AUTO: NEGATIVE
NRBC # BLD: 0 K/UL (ref 0–0.2)
OTHER OBSERVATIONS: ABNORMAL
PH UR STRIP: 5.5 (ref 5–9)
PLATELET # BLD AUTO: 183 K/UL (ref 150–450)
PMV BLD AUTO: 9.8 FL (ref 9.4–12.3)
POTASSIUM SERPL-SCNC: 4 MMOL/L (ref 3.5–5.1)
PROCALCITONIN SERPL-MCNC: 4.32 NG/ML (ref 0–0.1)
PROT SERPL-MCNC: 6.6 G/DL (ref 6.3–8.2)
PROT UR STRIP-MCNC: 100 MG/DL
RBC # BLD AUTO: 4.38 M/UL (ref 4.05–5.2)
RBC #/AREA URNS HPF: ABNORMAL /HPF
SODIUM SERPL-SCNC: 134 MMOL/L (ref 136–145)
SP GR UR REFRACTOMETRY: 1.02 (ref 1–1.02)
UROBILINOGEN UR QL STRIP.AUTO: 1 EU/DL (ref 0.2–1)
WBC # BLD AUTO: 17 K/UL (ref 4.3–11.1)
WBC URNS QL MICRO: ABNORMAL /HPF

## 2025-03-06 PROCEDURE — G0378 HOSPITAL OBSERVATION PER HR: HCPCS

## 2025-03-06 PROCEDURE — 84145 PROCALCITONIN (PCT): CPT

## 2025-03-06 PROCEDURE — 80053 COMPREHEN METABOLIC PANEL: CPT

## 2025-03-06 PROCEDURE — 87040 BLOOD CULTURE FOR BACTERIA: CPT

## 2025-03-06 PROCEDURE — 6370000000 HC RX 637 (ALT 250 FOR IP): Performed by: INTERNAL MEDICINE

## 2025-03-06 PROCEDURE — 2500000003 HC RX 250 WO HCPCS: Performed by: EMERGENCY MEDICINE

## 2025-03-06 PROCEDURE — 96365 THER/PROPH/DIAG IV INF INIT: CPT

## 2025-03-06 PROCEDURE — 2580000003 HC RX 258: Performed by: EMERGENCY MEDICINE

## 2025-03-06 PROCEDURE — 81001 URINALYSIS AUTO W/SCOPE: CPT

## 2025-03-06 PROCEDURE — 83605 ASSAY OF LACTIC ACID: CPT

## 2025-03-06 PROCEDURE — 99285 EMERGENCY DEPT VISIT HI MDM: CPT

## 2025-03-06 PROCEDURE — 2500000003 HC RX 250 WO HCPCS: Performed by: INTERNAL MEDICINE

## 2025-03-06 PROCEDURE — 96375 TX/PRO/DX INJ NEW DRUG ADDON: CPT

## 2025-03-06 PROCEDURE — 85025 COMPLETE CBC W/AUTO DIFF WBC: CPT

## 2025-03-06 PROCEDURE — 6360000002 HC RX W HCPCS: Performed by: EMERGENCY MEDICINE

## 2025-03-06 PROCEDURE — 87086 URINE CULTURE/COLONY COUNT: CPT

## 2025-03-06 PROCEDURE — 71046 X-RAY EXAM CHEST 2 VIEWS: CPT

## 2025-03-06 RX ORDER — POTASSIUM CHLORIDE 1500 MG/1
40 TABLET, EXTENDED RELEASE ORAL PRN
Status: DISCONTINUED | OUTPATIENT
Start: 2025-03-06 | End: 2025-03-08 | Stop reason: HOSPADM

## 2025-03-06 RX ORDER — SODIUM CHLORIDE 0.9 % (FLUSH) 0.9 %
5-40 SYRINGE (ML) INJECTION PRN
Status: DISCONTINUED | OUTPATIENT
Start: 2025-03-06 | End: 2025-03-08 | Stop reason: HOSPADM

## 2025-03-06 RX ORDER — ACETAMINOPHEN 325 MG/1
650 TABLET ORAL EVERY 6 HOURS PRN
Status: DISCONTINUED | OUTPATIENT
Start: 2025-03-06 | End: 2025-03-08 | Stop reason: HOSPADM

## 2025-03-06 RX ORDER — SODIUM CHLORIDE 0.9 % (FLUSH) 0.9 %
5-40 SYRINGE (ML) INJECTION EVERY 12 HOURS SCHEDULED
Status: DISCONTINUED | OUTPATIENT
Start: 2025-03-06 | End: 2025-03-08 | Stop reason: HOSPADM

## 2025-03-06 RX ORDER — MAGNESIUM SULFATE IN WATER 40 MG/ML
2000 INJECTION, SOLUTION INTRAVENOUS PRN
Status: DISCONTINUED | OUTPATIENT
Start: 2025-03-06 | End: 2025-03-08 | Stop reason: HOSPADM

## 2025-03-06 RX ORDER — GUAIFENESIN 200 MG/10ML
200 LIQUID ORAL EVERY 4 HOURS PRN
Status: DISCONTINUED | OUTPATIENT
Start: 2025-03-06 | End: 2025-03-08 | Stop reason: HOSPADM

## 2025-03-06 RX ORDER — ACETAMINOPHEN 650 MG/1
650 SUPPOSITORY RECTAL EVERY 6 HOURS PRN
Status: DISCONTINUED | OUTPATIENT
Start: 2025-03-06 | End: 2025-03-08 | Stop reason: HOSPADM

## 2025-03-06 RX ORDER — METOCLOPRAMIDE HYDROCHLORIDE 5 MG/ML
10 INJECTION INTRAMUSCULAR; INTRAVENOUS
Status: COMPLETED | OUTPATIENT
Start: 2025-03-06 | End: 2025-03-06

## 2025-03-06 RX ORDER — 0.9 % SODIUM CHLORIDE 0.9 %
1000 INTRAVENOUS SOLUTION INTRAVENOUS
Status: COMPLETED | OUTPATIENT
Start: 2025-03-06 | End: 2025-03-06

## 2025-03-06 RX ORDER — ONDANSETRON 4 MG/1
4 TABLET, ORALLY DISINTEGRATING ORAL EVERY 8 HOURS PRN
Status: DISCONTINUED | OUTPATIENT
Start: 2025-03-06 | End: 2025-03-08 | Stop reason: HOSPADM

## 2025-03-06 RX ORDER — DIPHENHYDRAMINE HYDROCHLORIDE 50 MG/ML
12.5 INJECTION, SOLUTION INTRAMUSCULAR; INTRAVENOUS
Status: COMPLETED | OUTPATIENT
Start: 2025-03-06 | End: 2025-03-06

## 2025-03-06 RX ORDER — POTASSIUM CHLORIDE 7.45 MG/ML
10 INJECTION INTRAVENOUS PRN
Status: DISCONTINUED | OUTPATIENT
Start: 2025-03-06 | End: 2025-03-08 | Stop reason: HOSPADM

## 2025-03-06 RX ORDER — POLYETHYLENE GLYCOL 3350 17 G/17G
17 POWDER, FOR SOLUTION ORAL DAILY PRN
Status: DISCONTINUED | OUTPATIENT
Start: 2025-03-06 | End: 2025-03-08 | Stop reason: HOSPADM

## 2025-03-06 RX ORDER — ONDANSETRON 2 MG/ML
4 INJECTION INTRAMUSCULAR; INTRAVENOUS EVERY 6 HOURS PRN
Status: DISCONTINUED | OUTPATIENT
Start: 2025-03-06 | End: 2025-03-08 | Stop reason: HOSPADM

## 2025-03-06 RX ORDER — SODIUM CHLORIDE 9 MG/ML
INJECTION, SOLUTION INTRAVENOUS PRN
Status: DISCONTINUED | OUTPATIENT
Start: 2025-03-06 | End: 2025-03-08 | Stop reason: HOSPADM

## 2025-03-06 RX ORDER — KETOROLAC TROMETHAMINE 30 MG/ML
30 INJECTION, SOLUTION INTRAMUSCULAR; INTRAVENOUS
Status: COMPLETED | OUTPATIENT
Start: 2025-03-06 | End: 2025-03-06

## 2025-03-06 RX ADMIN — DIPHENHYDRAMINE HYDROCHLORIDE 12.5 MG: 50 INJECTION INTRAMUSCULAR; INTRAVENOUS at 14:35

## 2025-03-06 RX ADMIN — WATER 1000 MG: 1 INJECTION INTRAMUSCULAR; INTRAVENOUS; SUBCUTANEOUS at 16:55

## 2025-03-06 RX ADMIN — SODIUM CHLORIDE, PRESERVATIVE FREE 10 ML: 5 INJECTION INTRAVENOUS at 20:54

## 2025-03-06 RX ADMIN — METOCLOPRAMIDE 10 MG: 5 INJECTION, SOLUTION INTRAMUSCULAR; INTRAVENOUS at 14:39

## 2025-03-06 RX ADMIN — AZITHROMYCIN MONOHYDRATE 500 MG: 500 INJECTION, POWDER, LYOPHILIZED, FOR SOLUTION INTRAVENOUS at 17:02

## 2025-03-06 RX ADMIN — ACETAMINOPHEN 650 MG: 325 TABLET, FILM COATED ORAL at 23:33

## 2025-03-06 RX ADMIN — SODIUM CHLORIDE 1000 ML: 9 INJECTION, SOLUTION INTRAVENOUS at 15:38

## 2025-03-06 RX ADMIN — KETOROLAC TROMETHAMINE 30 MG: 30 INJECTION, SOLUTION INTRAMUSCULAR at 14:38

## 2025-03-06 ASSESSMENT — ENCOUNTER SYMPTOMS
VOICE CHANGE: 0
COLOR CHANGE: 0
STRIDOR: 0
CHEST TIGHTNESS: 0
EYE PAIN: 0
APNEA: 0
BACK PAIN: 0
ABDOMINAL PAIN: 0
VOMITING: 0
SWOLLEN GLANDS: 0
EYE REDNESS: 0

## 2025-03-06 ASSESSMENT — PAIN SCALES - GENERAL
PAINLEVEL_OUTOF10: 0
PAINLEVEL_OUTOF10: 9

## 2025-03-06 ASSESSMENT — PAIN DESCRIPTION - LOCATION: LOCATION: HEAD

## 2025-03-06 ASSESSMENT — VISUAL ACUITY: OU: 1

## 2025-03-06 ASSESSMENT — PAIN - FUNCTIONAL ASSESSMENT: PAIN_FUNCTIONAL_ASSESSMENT: NONE - DENIES PAIN

## 2025-03-06 ASSESSMENT — LIFESTYLE VARIABLES
HOW MANY STANDARD DRINKS CONTAINING ALCOHOL DO YOU HAVE ON A TYPICAL DAY: 1 OR 2
HOW OFTEN DO YOU HAVE A DRINK CONTAINING ALCOHOL: 2-3 TIMES A WEEK

## 2025-03-06 ASSESSMENT — PAIN DESCRIPTION - DESCRIPTORS: DESCRIPTORS: ACHING;DISCOMFORT

## 2025-03-06 NOTE — ED NOTES
TRANSFER - OUT REPORT:    Verbal report given to Nevaeh BARRERA on Pablo Rodriguez  being transferred to [facility] for routine progression of patient care       Report consisted of patient's Situation, Background, Assessment and   Recommendations(SBAR).     Information from the following report(s) Nurse Handoff Report was reviewed with the receiving nurse.    [place] Fall Assessment:    Presents to emergency department  because of falls (Syncope, seizure, or loss of consciousness): No  Age > 70: No  Altered Mental Status, Intoxication with alcohol or substance confusion (Disorientation, impaired judgment, poor safety awaremess, or inability to follow instructions): No  Impaired Mobility: Ambulates or transfers with assistive devices or assistance; Unable to ambulate or transer.: No  Nursing Judgement: No          Lines:   Peripheral IV 03/06/25 Left Antecubital (Active)   Site Assessment Clean, dry & intact 03/06/25 1416   Line Status Blood return noted 03/06/25 1416       Peripheral IV 03/06/25 Left;Posterior Hand (Active)   Site Assessment Clean, dry & intact 03/06/25 1704   Line Status Brisk blood return 03/06/25 1704   Line Care Connections checked and tightened 03/06/25 1704   Phlebitis Assessment No symptoms 03/06/25 1704   Infiltration Assessment 0 03/06/25 1704   Dressing Status Clean, dry & intact 03/06/25 1704        Opportunity for questions and clarification was provided.      Patient transported with:  Jannette Morse RN  03/06/25 [id number]

## 2025-03-06 NOTE — ED TRIAGE NOTES
Per patient occipital portion h/a that started x3 weeks prior to arrival. States pain has progressed to frontal portion of head. States of chills at night. Evaluated yesterday for h/a with no relief. States nasal congestion. \"I'm concerned that it may be sinus infection.\"

## 2025-03-06 NOTE — ED PROVIDER NOTES
Emergency Department Provider Note       PCP: Jennifer Garcia MD   Age: 64 y.o.   Sex: female     DISPOSITION Decision To Admit 03/06/2025 04:46:26 PM    ICD-10-CM    1. Sepsis, due to unspecified organism, unspecified whether acute organ dysfunction present (HCC)  A41.9       2. Pneumonia due to infectious organism, unspecified laterality, unspecified part of lung  J18.9           Medical Decision Making     I will review records from yesterday.  However she had an MRI performed with and without contrast that shows no gross acute abnormalities.  Laboratory data is mostly reassuring.  Nevertheless we will repeat basic labs here.  She did not does not endorse much vomiting.  Will trial migraine cocktail here.    4:47 PM EST  Headache is improved however patient was noted to have increasing leukocytosis from 14-17.  Had obtain a procalcitonin that was noted be elevated at 4.3.  Urinalysis does show 2+ bacteria and 10 oh white blood cells.  However chest x-ray shows dense right upper lobe posterior late inflammation concerning for pneumonia versus mass.    To further review of patient's history she has a history of MAC.  She never completed long-term antibiotic therapy.    She does endorse she has had cough intermittently weeks.  Reports weight loss and night sweats.  Now has an elevation of her LFTs.  I feel she requires admission Case discussed with hospitalist.  Likely needs a pulmonary consult.  I have initiated Rocephin as well as azithromycin.       1 or more acute illnesses that pose a threat to life or bodily function.   1 chronic illness with exacerbation.  1 acute illness with systemic symptoms.  Chronic medical problems impacting care include history of MAC.  Shared medical decision making was utilized in creating the patients health plan today.  I independently ordered and reviewed each unique test.    I reviewed external records: ED visit note from a different ED.   I reviewed external records: provider  during this emergency department visit:     Orders Placed This Encounter   Procedures    Blood Culture 1    Blood Culture 2    Culture, Urine    XR CHEST (2 VW)    CBC with Auto Differential    Comprehensive Metabolic Panel    Urinalysis w rflx microscopic    Procalcitonin    Lactate, Sepsis    POCT Urine Dipstick    Insert peripheral IV        Medications given during this emergency department visit:     Medications   azithromycin (ZITHROMAX) 500 mg in sodium chloride 0.9 % 250 mL IVPB (Naor6Hzn) (has no administration in time range)   cefTRIAXone (ROCEPHIN) 1,000 mg in sterile water 10 mL IV syringe (has no administration in time range)   metoclopramide (REGLAN) injection 10 mg (10 mg IntraVENous Given 3/6/25 1439)   ketorolac (TORADOL) injection 30 mg (30 mg IntraVENous Given 3/6/25 1438)   diphenhydrAMINE (BENADRYL) injection 12.5 mg (12.5 mg IntraVENous Given 3/6/25 1435)   sodium chloride 0.9 % bolus 1,000 mL (1,000 mLs IntraVENous New Bag 3/6/25 1538)       New prescriptions:     New Prescriptions    No medications on file        Past History and Complexity:     Past Medical History:   Diagnosis Date    Slow to wake up after anesthesia         Past Surgical History:   Procedure Laterality Date    BRONCHOSCOPY N/A 9/19/2023    BRONCHOSCOPY BAL performed by Vin Everett MD at [facility] ENDOSCOPY    HYSTERECTOMY (CERVIX STATUS UNKNOWN)      TONSILLECTOMY          Social History     Socioeconomic History    Marital status: Legally    Tobacco Use    Smoking status: Never    Smokeless tobacco: Never   Substance and Sexual Activity    Alcohol use: Not Currently     Comment: occ    Drug use: Not Currently     Types: Cocaine     Social Determinants of Health     Social Connections: Unknown (3/20/2021)    Received from [facility]    Social Connections     Frequency of Communication with Friends and Family: Not asked     Frequency of Social Gatherings with Friends and Family: Not asked   Intimate Partner

## 2025-03-06 NOTE — PROGRESS NOTES
TRANSFER - IN REPORT:    Verbal report received from HOLLY Bhatia(name) on Pablo Rodriguez  being received from ER(unit) for routine progression of patient care      Report consisted of patient’s Situation, Background, Assessment and   Recommendations(SBAR).     Information from the following report(s) Nurse Handoff Report, Index, MAR, Recent Results, and Event Log was reviewed with the receiving nurse.    Opportunity for questions and clarification was provided.      Report given to Jose Andrade RN, who will assume primary care of patient on arrival to floor.

## 2025-03-06 NOTE — H&P
Hospitalist History and Physical   Admit Date:  3/6/2025  2:13 PM   Name:  Pablo Rodriguez   Age:  64 y.o.  Sex:  female  :  1960   MRN:  [id number]   Room:  [place] [address]    Presenting/Chief Complaint: Headache     Reason(s) for Admission: Pneumonia due to infectious organism [J18.9]     History of Present Illness:   Pablo Rodriguez is a 64 y.o. 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 She was seen in the ED yesterday with the same complaint and had a Head CT that showed possible diffuse cerebral edema with recommendations for a Brain MRI. This was completed and showed no significant issues.     Today she continues to have a persistent headache. She was given IV Benadryl, Toradol, and Reglan and reports this has helped significantly.     Labs were obtained and metabolic panel shows a sodium of 134, chloride of 96, mildly elevated LFTs.  Lactic acid is 1.2 but Pro-Jose is 4.32.  CBC was also ordered and WBC is 17 up from yesterday which was 13.9.  Blood cultures have been ordered.  UA was ordered and is slightly positive so may be the beginnings of a urinary tract infection.    Chest x-ray was obtained and shows Mass versus consoldiation in the posterior/inferior right upper lobe, likely crossing into the right middle lobe. Radiology has recommended a contrasted chest CT. IV Azithromycin and Rocephin ordered and given in the ED. She also recieved 1L NS.     Hospitalist team consulted for admission. Will consulted Pulm as they do see her in office. She refused any further PRN medication for her headache at this time, but reports she will ask for it if she needs it. Would not like be to place PRNs. Does not take any prescription medications, but does take several OTC supplements. She would like to continue her pre/pro/postbiotic and this has  Multiple 2D axial images obtained through the brain without intravenous contrast.  Radiation dose reduction techniques were used for this study:  All CT scans performed at this facility use one or all of the following: Automated exposure control, adjustment of the mA and/or kVp according to patient's size, iterative reconstruction. COMPARISON: None FINDINGS: Diffuse sulcal effacement with prominent hypodensity of the white matter. Partial effacement of the suprasellar cistern. Findings may suggest a degree of cerebral edema. No intracranial hemorrhage or midline shift. No hydrocephalus. Mild left sphenoid sinusitis. The sinuses are otherwise clear. The mastoids are clear. No skull fracture.     Question diffuse cerebral edema. MRI with/without contrast is recommended. No intracranial hemorrhage or midline shift. Findings relayed with ordering provider Claudia MARTINEZ at 10:30 a.m. on 3/5/2025. Message was acknowledged. Plan for MRI. Electronically signed by Jong Ibarra        Signed:  Rosenda Perdomo DO    Part of this note may have been written by using a voice dictation software.  The note has been proof read but may still contain some grammatical/other typographical errors.

## 2025-03-07 ENCOUNTER — APPOINTMENT (OUTPATIENT)
Dept: CT IMAGING | Age: 65
DRG: 871 | End: 2025-03-07
Payer: COMMERCIAL

## 2025-03-07 LAB
ANION GAP SERPL CALC-SCNC: 9 MMOL/L (ref 7–16)
BASOPHILS # BLD: 0.05 K/UL (ref 0–0.2)
BASOPHILS NFR BLD: 0.4 % (ref 0–2)
BUN SERPL-MCNC: 21 MG/DL (ref 8–23)
CALCIUM SERPL-MCNC: 8.9 MG/DL (ref 8.8–10.2)
CHLORIDE SERPL-SCNC: 104 MMOL/L (ref 98–107)
CO2 SERPL-SCNC: 22 MMOL/L (ref 20–29)
CREAT SERPL-MCNC: 0.98 MG/DL (ref 0.6–1.1)
DIFFERENTIAL METHOD BLD: ABNORMAL
EOSINOPHIL # BLD: 0.06 K/UL (ref 0–0.8)
EOSINOPHIL NFR BLD: 0.4 % (ref 0.5–7.8)
ERYTHROCYTE [DISTWIDTH] IN BLOOD BY AUTOMATED COUNT: 12.2 % (ref 11.9–14.6)
GLUCOSE SERPL-MCNC: 107 MG/DL (ref 70–99)
HCT VFR BLD AUTO: 32.8 % (ref 35.8–46.3)
HGB BLD-MCNC: 11 G/DL (ref 11.7–15.4)
IMM GRANULOCYTES # BLD AUTO: 0.38 K/UL (ref 0–0.5)
IMM GRANULOCYTES NFR BLD AUTO: 2.7 % (ref 0–5)
LYMPHOCYTES # BLD: 1.54 K/UL (ref 0.5–4.6)
LYMPHOCYTES NFR BLD: 10.8 % (ref 13–44)
MCH RBC QN AUTO: 30.4 PG (ref 26.1–32.9)
MCHC RBC AUTO-ENTMCNC: 33.5 G/DL (ref 31.4–35)
MCV RBC AUTO: 90.6 FL (ref 82–102)
MONOCYTES # BLD: 1.12 K/UL (ref 0.1–1.3)
MONOCYTES NFR BLD: 7.9 % (ref 4–12)
NEUTS SEG # BLD: 11.09 K/UL (ref 1.7–8.2)
NEUTS SEG NFR BLD: 77.8 % (ref 43–78)
NRBC # BLD: 0 K/UL (ref 0–0.2)
PLATELET # BLD AUTO: 159 K/UL (ref 150–450)
PMV BLD AUTO: 10.3 FL (ref 9.4–12.3)
POTASSIUM SERPL-SCNC: 3.7 MMOL/L (ref 3.5–5.1)
RBC # BLD AUTO: 3.62 M/UL (ref 4.05–5.2)
SODIUM SERPL-SCNC: 135 MMOL/L (ref 136–145)
WBC # BLD AUTO: 14.2 K/UL (ref 4.3–11.1)

## 2025-03-07 PROCEDURE — 96366 THER/PROPH/DIAG IV INF ADDON: CPT

## 2025-03-07 PROCEDURE — 2500000003 HC RX 250 WO HCPCS: Performed by: NURSE PRACTITIONER

## 2025-03-07 PROCEDURE — 36415 COLL VENOUS BLD VENIPUNCTURE: CPT

## 2025-03-07 PROCEDURE — G0378 HOSPITAL OBSERVATION PER HR: HCPCS

## 2025-03-07 PROCEDURE — 6370000000 HC RX 637 (ALT 250 FOR IP): Performed by: INTERNAL MEDICINE

## 2025-03-07 PROCEDURE — 71250 CT THORAX DX C-: CPT

## 2025-03-07 PROCEDURE — 2580000003 HC RX 258: Performed by: INTERNAL MEDICINE

## 2025-03-07 PROCEDURE — 6370000000 HC RX 637 (ALT 250 FOR IP): Performed by: FAMILY MEDICINE

## 2025-03-07 PROCEDURE — 2500000003 HC RX 250 WO HCPCS: Performed by: INTERNAL MEDICINE

## 2025-03-07 PROCEDURE — 85025 COMPLETE CBC W/AUTO DIFF WBC: CPT

## 2025-03-07 PROCEDURE — 99223 1ST HOSP IP/OBS HIGH 75: CPT | Performed by: INTERNAL MEDICINE

## 2025-03-07 PROCEDURE — 80048 BASIC METABOLIC PNL TOTAL CA: CPT

## 2025-03-07 PROCEDURE — 6360000002 HC RX W HCPCS: Performed by: INTERNAL MEDICINE

## 2025-03-07 PROCEDURE — 96376 TX/PRO/DX INJ SAME DRUG ADON: CPT

## 2025-03-07 RX ORDER — TRAMADOL HYDROCHLORIDE 50 MG/1
50 TABLET ORAL EVERY 6 HOURS PRN
Status: DISCONTINUED | OUTPATIENT
Start: 2025-03-07 | End: 2025-03-08 | Stop reason: HOSPADM

## 2025-03-07 RX ADMIN — ACETAMINOPHEN 650 MG: 325 TABLET, FILM COATED ORAL at 08:17

## 2025-03-07 RX ADMIN — TRAMADOL HYDROCHLORIDE 50 MG: 50 TABLET, COATED ORAL at 23:49

## 2025-03-07 RX ADMIN — ACETAMINOPHEN 650 MG: 325 TABLET, FILM COATED ORAL at 20:07

## 2025-03-07 RX ADMIN — SODIUM CHLORIDE, PRESERVATIVE FREE 10 ML: 5 INJECTION INTRAVENOUS at 20:08

## 2025-03-07 RX ADMIN — ACETAMINOPHEN 650 MG: 325 TABLET, FILM COATED ORAL at 14:12

## 2025-03-07 RX ADMIN — TRAMADOL HYDROCHLORIDE 50 MG: 50 TABLET, COATED ORAL at 16:19

## 2025-03-07 RX ADMIN — AZITHROMYCIN MONOHYDRATE 500 MG: 500 INJECTION, POWDER, LYOPHILIZED, FOR SOLUTION INTRAVENOUS at 16:19

## 2025-03-07 RX ADMIN — SODIUM CHLORIDE, PRESERVATIVE FREE 10 ML: 5 INJECTION INTRAVENOUS at 08:18

## 2025-03-07 RX ADMIN — GUAIFENESIN 200 MG: 200 SOLUTION ORAL at 00:00

## 2025-03-07 RX ADMIN — CEFTRIAXONE 1000 MG: 1 INJECTION, POWDER, FOR SOLUTION INTRAMUSCULAR; INTRAVENOUS at 16:15

## 2025-03-07 ASSESSMENT — PAIN DESCRIPTION - LOCATION
LOCATION: HEAD
LOCATION: HEAD

## 2025-03-07 ASSESSMENT — PAIN SCALES - GENERAL
PAINLEVEL_OUTOF10: 0
PAINLEVEL_OUTOF10: 6
PAINLEVEL_OUTOF10: 3
PAINLEVEL_OUTOF10: 7

## 2025-03-07 ASSESSMENT — PAIN DESCRIPTION - DESCRIPTORS
DESCRIPTORS: ACHING
DESCRIPTORS: ACHING

## 2025-03-07 NOTE — CONSULTS
History and Physical Initial Visit NOTE           3/7/2025    Pablo Rodriguez                        Date of Admission:  3/6/2025    Attending Attestation- Pulmonary & Critical Care    In this split/shared evaluation I performed reviewed the patients's H&P, available images, labs, cultures., discussed case in detail with NPP, performed a medically appropriate history and exam, counseled and educated the patient and/or family member, ordered and/or reviewed medications, tests or procedures, documented information in EMR, independently interpreted images, and coordinated care. which accounted for 35 minutes clinical time.     Impression: Mrs. Rodriguez is admitted after presenting with a headache.  She also developed acute right chest wall pain 3 days ago.  She has a complex pulmonary history with pulmonary infiltrates, h/o NTM infection as well as recurrent pneumonia.  Today she has a leukocytosis and multilobar infiltrates which appear stable on CXR.    Recommendations:   Update CT chest given new chest wall pain.  Agree with abx for pneumonia.      Janice Forte MD  _____________________________________________________________________________________________      Subjective:     Patient is a 64 y.o.  female seen and evaluated at the request of Dr. Perdomo.       She has had a significant headache for about a week and been seen in the ER. She also reports a several day history of right sided/lateral chest pain that sounds pleuritic in nature. She has not had any cough nor felt congested but does feel short of breath. Her appetite/intake has been poor due to the headache. She has had chills and sweats but unaware of any fevers.     Work up in the ER includes a Brain MRI which was negative.  CXR was obtained due to an elevated PCT (4.32) and WBC of 17. This showed consolidation in the RUL/RML  She has been started on Rocephin and Zithromax. WBC is down to 14 today.      She is followed by  Never   Substance and Sexual Activity    Alcohol use: Not Currently     Comment: occ    Drug use: Not Currently     Types: Cocaine    Sexual activity: Not on file   Other Topics Concern    Not on file   Social History Narrative    Not on file     Social Determinants of Health     Financial Resource Strain: Not on file   Food Insecurity: No Food Insecurity (3/6/2025)    Hunger Vital Sign     Worried About Running Out of Food in the Last Year: Never true     Ran Out of Food in the Last Year: Never true   Transportation Needs: No Transportation Needs (3/6/2025)    PRAPARE - Transportation     Lack of Transportation (Medical): No     Lack of Transportation (Non-Medical): No   Physical Activity: Not on file   Stress: Not on file   Social Connections: Unknown (3/20/2021)    Received from [employer]    Social Connections     Frequency of Communication with Friends and Family: Not asked     Frequency of Social Gatherings with Friends and Family: Not asked   Intimate Partner Violence: Unknown (3/20/2021)    Received from [employer]    Intimate Partner Violence     Fear of Current or Ex-Partner: Not asked     Emotionally Abused: Not asked     Physically Abused: Not asked     Sexually Abused: Not asked   Housing Stability: Low Risk  (3/6/2025)    Housing Stability Vital Sign     Unable to Pay for Housing in the Last Year: No     Number of Times Moved in the Last Year: 0     Homeless in the Last Year: No     No family history on file.  Allergies   Allergen Reactions    Amoxicillin Hives    Augmentin [Amoxicillin-Pot Clavulanate] Hives    Soy      Other reaction(s): Rash-Allergy    Wheat      Other reaction(s): Rash-Allergy    Prednisone Rash     Other reaction(s): Hives/Swelling-Allergy  Pt stated allergic to steroids     Objective:   Blood pressure 94/82, pulse 88, temperature 98.1 °F (36.7 °C), temperature source Oral, resp. rate 18, height 1.727 m (5' 8\"), weight 61.2 kg (135 lb), SpO2 98%. No intake or output data in  2024 but declined tx as she was feeling well at time of dx. Hx H flu in 2023 and 2024.      Principal Problem:    Pneumonia due to infectious organism  Plan: No sputum for culture. Blood cultures pending. WBC down to 14 from 17 today. PCT elevated at 4.32 on admission. Day 2 Rocephin/Zithromax.   Active Problems:    Recurrent pneumonia  Plan: Hx H flu per cultures in 2023, 2024.     Migraine  Plan: per hospitalist    Transaminitis  Plan: Improved today. Associated with pna     UTI (urinary tract infection)  Plan: + UA. On abx     --day 2 rocephin/zithromax  --will need follow up imaging to reassess consolidation vs getting CT to further evaluate      Full Code    Thank you very much for this referral.  We appreciate the opportunity to participate in this patient's care.  Will follow along with above stated plan.    In this split/shared evaluation I performed performed a medically appropriate history and exam, counseled and educated the patient and/or family member, ordered medications, tests or procedures, documented information in EMR, and coordinated care. which accounted for 25 minutes clinical time.     Nevaeh Calvin, APRN - CNP

## 2025-03-07 NOTE — PROGRESS NOTES
Hospitalist Progress Note   Admit Date:  3/6/2025  2:13 PM   Name:  Pablo Rodriguez   Age:  64 y.o.  Sex:  female  :  1960   MRN:  [id number]   Room:  [facility]    Presenting/Chief Complaint: Headache     Reason(s) for Admission: Pneumonia due to infectious organism [J18.9]  Pneumonia due to infectious organism, unspecified laterality, unspecified part of lung [J18.9]  Sepsis, due to unspecified organism, unspecified whether acute organ dysfunction present (HCC) [A41.9]     Hospital Course:   Pablo Rodriguez is a 64 y.o. 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  Of note, she was seen in the ED on the day prior with the same complaint and had a Head CT that showed possible diffuse cerebral edema with recommendations for a Brain MRI. This was completed and showed no significant issues and was discharged home. She was given IV Benadryl, Toradol, and Reglan and reports this has helped significantly.     ED workup concerning for pneumonia.  Procalcitonin 4.32 and WBC of 17.  Chest x-ray showed mass versus consolidation in the posterior/inferior right upper lobe.  Patient admitted for sepsis secondary to community-acquired pneumonia.  Started on ceftriaxone and azithromycin.  Pulmonology consulted.    Subjective & 24hr Events:   Patient seen and examined at the bedside.  Reports overall she is feeling better.  Complaining of headache and has received Tylenol which has helped.  Complaining of some right-sided pleuritic chest pain.  Denies cough, congestion, nausea or vomiting.  Complaining of some exertional shortness of breath.    Assessment & Plan:     Sepsis  Community-acquired pneumonia  Recurrent pneumonia, Known MAC  Met sepsis criteria with Tachycardia, Tachypnea, Leukocytosis, and infection  Continue azithromycin and Rocephin  Follow-up  on blood culture and sputum cultures  Pulmonology consulted, appreciate recommendation.  Patient may need CT imaging for further workup versus bronc     Migraine  MRI brain 3/5 without acute findings  Received cocktail in the emergency room, significantly improved  Continue Tylenol as needed     Transaminitis  Likely in the setting of acute illness  Continue to monitor, improving     UTI (urinary tract infection)  Questionable, but is on Rocephin      Anticipated Discharge Arrangements:   Home    PT/OT evals ordered?  Not ordered; patient not expected to need rehab  Diet:  ADULT DIET; Regular  VTE prophylaxis: SCD's   Code status: Full Code      Non-peripheral Lines and Tubes (if present):          Telemetry (if present):           Hospital Problems:  Principal Problem:    Pneumonia due to infectious organism  Active Problems:    Recurrent pneumonia    Migraine    Transaminitis    Elevated procalcitonin    UTI (urinary tract infection)    Sepsis (HCC)  Resolved Problems:    * No resolved hospital problems. *      Objective:   Patient Vitals for the past 24 hrs:   Temp Pulse Resp BP SpO2   03/07/25 1132 97.3 °F (36.3 °C) 83 -- 108/79 96 %   03/07/25 0728 98.1 °F (36.7 °C) 88 18 94/82 98 %   03/07/25 0303 98.2 °F (36.8 °C) 87 16 92/61 96 %   03/06/25 2335 98.4 °F (36.9 °C) 92 18 101/64 99 %   03/06/25 1908 98.1 °F (36.7 °C) 79 17 116/69 99 %   03/06/25 1824 97.9 °F (36.6 °C) 86 18 118/79 97 %   03/06/25 1658 -- -- -- 104/67 96 %   03/06/25 1643 -- -- -- 107/77 98 %   03/06/25 1628 -- -- -- 109/78 98 %   03/06/25 1613 -- -- -- 98/68 98 %   03/06/25 1607 -- -- -- 101/65 97 %   03/06/25 1457 98.6 °F (37 °C) 95 -- 99/70 96 %   03/06/25 1407 98.2 °F (36.8 °C) 100 20 (!) 118/58 99 %       Oxygen Therapy  SpO2: 96 %  Pulse via Oximetry: 92 beats per minute  O2 Device: None (Room air)    Estimated body mass index is 20.53 kg/m² as calculated from the following:    Height as of this encounter: 1.727 m (5' 8\").    Weight as of

## 2025-03-07 NOTE — PROGRESS NOTES
Assumed care of patient. Assessment completed and documented, see docflow. Patient A/Ox3, resting quietly in bed. NAD noted at present. Respirations even and non labored. Verbalized understanding to call for needs. Call light within reach. Will monitor.

## 2025-03-07 NOTE — PROGRESS NOTES
During shift handoff, family member asked this RN and dayshift RN about plan of care during admission. Both this RN and dayshift RN attempted to explain and educate family member about hospital admission and structure. Family member stated \"I'm not convinced. We need to speak with the doctor.\" Upon this RN's assessment of patient, patient expressed concern about \"not being hooked up to anything\" and \"waiting until tomorrow for a doctor\". This RN expressed sympathy, but able to explain plan of care. Patient communicated desire to leave, but agreed to stay overnight to speak with MD in AM. Bed low and locked, call light within reach. Patient instructed to call should further needs arise.

## 2025-03-07 NOTE — CARE COORDINATION
03/07/25 0932   Service Assessment   Patient Orientation Alert and Oriented   Cognition Alert   History Provided By Patient   Primary Caregiver Self   Accompanied By/Relationship no one at bedside   Support Systems Spouse/Significant Other   Patient's Healthcare Decision Maker is: Legal Next of Kin   PCP Verified by CM Yes   Last Visit to PCP Within last 3 months   Prior Functional Level Independent in ADLs/IADLs   Current Functional Level Independent in ADLs/IADLs   Can patient return to prior living arrangement Yes   Ability to make needs known: Good   Family able to assist with home care needs: Yes   Would you like for me to discuss the discharge plan with any other family members/significant others, and if so, who? Yes   Financial Resources Other (Comment)  (BCBS)   Community Resources None   Social/Functional History   Lives With Alone   Type of Home House   Home Equipment None   Receives Help From Family   Prior Level of Assist for ADLs Independent   Prior Level of Assist for Homemaking Independent   Homemaking Responsibilities No   Ambulation Assistance Independent   Prior Level of Assist for Transfers Independent   Active  Yes   Mode of Transportation Car   Occupation Full time employment   Discharge Planning   Type of Residence House   Living Arrangements Alone   Current Services Prior To Admission None   Potential Assistance Needed N/A   DME Ordered? No   Potential Assistance Purchasing Medications No   Type of Home Care Services None   Patient expects to be discharged to: House     Patient lives alone. Independent with ambulation and ADLs. Employed. Drives. DME: none  No history of HH or rehab. Confirmed patient has a pcp. CM is not anticipating any discharge needs.

## 2025-03-07 NOTE — PLAN OF CARE
Problem: Discharge Planning  Goal: Discharge to home or other facility with appropriate resources  3/7/2025 0835 by Jaquelin Whitmore, RN  Outcome: Progressing  3/6/2025 1950 by Renuka Whitmore, RN  Outcome: Progressing     Problem: Pain  Goal: Verbalizes/displays adequate comfort level or baseline comfort level  3/7/2025 0835 by Jaquelin Whitmore, RN  Outcome: Progressing  3/6/2025 1950 by Renuka Whitmore, RN  Outcome: Progressing

## 2025-03-08 ENCOUNTER — APPOINTMENT (OUTPATIENT)
Dept: CT IMAGING | Age: 65
DRG: 871 | End: 2025-03-08
Payer: COMMERCIAL

## 2025-03-08 VITALS
DIASTOLIC BLOOD PRESSURE: 73 MMHG | TEMPERATURE: 98.2 F | BODY MASS INDEX: 20.46 KG/M2 | SYSTOLIC BLOOD PRESSURE: 118 MMHG | HEIGHT: 68 IN | WEIGHT: 135 LBS | OXYGEN SATURATION: 95 % | HEART RATE: 90 BPM | RESPIRATION RATE: 18 BRPM

## 2025-03-08 LAB
ALBUMIN SERPL-MCNC: 2.3 G/DL (ref 3.2–4.6)
ALBUMIN/GLOB SERPL: 0.5 (ref 1–1.9)
ALP SERPL-CCNC: 122 U/L (ref 35–104)
ALT SERPL-CCNC: 37 U/L (ref 8–45)
AST SERPL-CCNC: 21 U/L (ref 15–37)
BILIRUB DIRECT SERPL-MCNC: <0.2 MG/DL (ref 0–0.4)
BILIRUB SERPL-MCNC: 0.4 MG/DL (ref 0–1.2)
GLOBULIN SER CALC-MCNC: 4.4 G/DL (ref 2.3–3.5)
PROT SERPL-MCNC: 6.7 G/DL (ref 6.3–8.2)

## 2025-03-08 PROCEDURE — 2500000003 HC RX 250 WO HCPCS: Performed by: INTERNAL MEDICINE

## 2025-03-08 PROCEDURE — 36415 COLL VENOUS BLD VENIPUNCTURE: CPT

## 2025-03-08 PROCEDURE — 6360000004 HC RX CONTRAST MEDICATION: Performed by: INTERNAL MEDICINE

## 2025-03-08 PROCEDURE — G0378 HOSPITAL OBSERVATION PER HR: HCPCS

## 2025-03-08 PROCEDURE — 86430 RHEUMATOID FACTOR TEST QUAL: CPT

## 2025-03-08 PROCEDURE — 99232 SBSQ HOSP IP/OBS MODERATE 35: CPT | Performed by: INTERNAL MEDICINE

## 2025-03-08 PROCEDURE — 86200 CCP ANTIBODY: CPT

## 2025-03-08 PROCEDURE — 6370000000 HC RX 637 (ALT 250 FOR IP): Performed by: INTERNAL MEDICINE

## 2025-03-08 PROCEDURE — 6370000000 HC RX 637 (ALT 250 FOR IP): Performed by: FAMILY MEDICINE

## 2025-03-08 PROCEDURE — 86235 NUCLEAR ANTIGEN ANTIBODY: CPT

## 2025-03-08 PROCEDURE — 71260 CT THORAX DX C+: CPT

## 2025-03-08 PROCEDURE — 1100000000 HC RM PRIVATE

## 2025-03-08 PROCEDURE — 80076 HEPATIC FUNCTION PANEL: CPT

## 2025-03-08 PROCEDURE — 6360000002 HC RX W HCPCS: Performed by: INTERNAL MEDICINE

## 2025-03-08 PROCEDURE — 86225 DNA ANTIBODY NATIVE: CPT

## 2025-03-08 PROCEDURE — 6370000000 HC RX 637 (ALT 250 FOR IP): Performed by: NURSE PRACTITIONER

## 2025-03-08 RX ORDER — GUAIFENESIN 600 MG/1
1200 TABLET, EXTENDED RELEASE ORAL 2 TIMES DAILY
Status: DISCONTINUED | OUTPATIENT
Start: 2025-03-08 | End: 2025-03-08 | Stop reason: HOSPADM

## 2025-03-08 RX ORDER — SODIUM CHLORIDE FOR INHALATION 3 %
4 VIAL, NEBULIZER (ML) INHALATION 2 TIMES DAILY
Status: DISCONTINUED | OUTPATIENT
Start: 2025-03-08 | End: 2025-03-08 | Stop reason: HOSPADM

## 2025-03-08 RX ORDER — AZITHROMYCIN 250 MG/1
250 TABLET, FILM COATED ORAL ONCE
Status: COMPLETED | OUTPATIENT
Start: 2025-03-08 | End: 2025-03-08

## 2025-03-08 RX ORDER — GUAIFENESIN 600 MG/1
1200 TABLET, EXTENDED RELEASE ORAL 2 TIMES DAILY
Qty: 28 TABLET | Refills: 0 | Status: SHIPPED | OUTPATIENT
Start: 2025-03-08 | End: 2025-03-15

## 2025-03-08 RX ORDER — CEFPODOXIME PROXETIL 200 MG/1
200 TABLET, FILM COATED ORAL 2 TIMES DAILY
Qty: 6 TABLET | Refills: 0 | Status: SHIPPED | OUTPATIENT
Start: 2025-03-09 | End: 2025-03-12

## 2025-03-08 RX ORDER — SODIUM CHLORIDE FOR INHALATION 3 %
4 VIAL, NEBULIZER (ML) INHALATION 2 TIMES DAILY
Status: DISCONTINUED | OUTPATIENT
Start: 2025-03-08 | End: 2025-03-08

## 2025-03-08 RX ORDER — IOPAMIDOL 755 MG/ML
100 INJECTION, SOLUTION INTRAVASCULAR
Status: COMPLETED | OUTPATIENT
Start: 2025-03-08 | End: 2025-03-08

## 2025-03-08 RX ORDER — AZITHROMYCIN 250 MG/1
250 TABLET, FILM COATED ORAL DAILY
Qty: 3 TABLET | Refills: 0 | Status: SHIPPED | OUTPATIENT
Start: 2025-03-08 | End: 2025-03-11

## 2025-03-08 RX ORDER — AZITHROMYCIN 250 MG/1
250 TABLET, FILM COATED ORAL DAILY
Qty: 3 TABLET | Refills: 0 | Status: CANCELLED | OUTPATIENT
Start: 2025-03-09 | End: 2025-03-12

## 2025-03-08 RX ADMIN — ACETAMINOPHEN 650 MG: 325 TABLET, FILM COATED ORAL at 09:01

## 2025-03-08 RX ADMIN — ACETAMINOPHEN 650 MG: 325 TABLET, FILM COATED ORAL at 14:51

## 2025-03-08 RX ADMIN — IOPAMIDOL 100 ML: 755 INJECTION, SOLUTION INTRAVENOUS at 13:25

## 2025-03-08 RX ADMIN — GUAIFENESIN 1200 MG: 600 TABLET ORAL at 12:15

## 2025-03-08 RX ADMIN — CEFTRIAXONE 1000 MG: 1 INJECTION, POWDER, FOR SOLUTION INTRAMUSCULAR; INTRAVENOUS at 14:37

## 2025-03-08 RX ADMIN — SODIUM CHLORIDE, PRESERVATIVE FREE 10 ML: 5 INJECTION INTRAVENOUS at 11:01

## 2025-03-08 RX ADMIN — AZITHROMYCIN DIHYDRATE 250 MG: 250 TABLET ORAL at 14:37

## 2025-03-08 RX ADMIN — TRAMADOL HYDROCHLORIDE 50 MG: 50 TABLET, COATED ORAL at 12:14

## 2025-03-08 RX ADMIN — ACETAMINOPHEN 650 MG: 325 TABLET, FILM COATED ORAL at 02:51

## 2025-03-08 ASSESSMENT — PAIN SCALES - GENERAL
PAINLEVEL_OUTOF10: 0
PAINLEVEL_OUTOF10: 3
PAINLEVEL_OUTOF10: 0

## 2025-03-08 NOTE — PROGRESS NOTES
Patient c/o of itching on back and what looks and feels like small insect bites. Looked through bed and room and see no visible insects. Rose Marie Severino NP notified via [employer] for fyi.

## 2025-03-08 NOTE — PROGRESS NOTES
This RN at bedside provided verbal and written discharge instructions. Instructions included new medications, medication schedule, and follow up appointments. Patient given opportunity to have all questions answered. Patient transported down to personal vehicle via wheelchair by staff.

## 2025-03-08 NOTE — DISCHARGE SUMMARY
tablet, Refills: 3      Acetylcysteine (NAC) 500 MG CAPS Take by mouth      Quercetin 500 MG CAPS Take by mouth      Albuterol-Budesonide (AIRSUPRA) 90-80 MCG/ACT AERO Inhale 2 puffs into the lungs every 6 hours as needed (wheezing, cough, shortness of breath, chest tightness.)  Qty: 1 g, Refills: 5      sodium chloride, Inhalant, 3 % nebulizer solution Take 4 mLs by nebulization in the morning, at noon, and at bedtime  Qty: 360 mL, Refills: 3      Bacillus Coagulans-Inulin (PROBIOTIC) 1-250 BILLION-MG CAPS Take by mouth      Ascorbic Acid (VITAMIN C) 1000 MG tablet 1 tab(s)             Some of the medications may be marked as \"stop taking\" by the system; but in reality patient or family reported already being off these meds; defer to outpatient/prescribing providers.      Procedures done this admission:  * No surgery found *    Consults this admission:  IP CONSULT TO PULMONOLOGY    Consultants will arrange their own follow ups, if applicable.    Echocardiogram results:  No results found for this or any previous visit.      Diagnostic Imaging/Tests:   CT CHEST PULMONARY EMBOLISM W CONTRAST  Result Date: 3/8/2025  Multifocal pneumonia worst in the right upper lobe. Recommend radiographic follow-up to resolution. No pulmonary embolism. Electronically signed by Jose Delong    CT CHEST WO CONTRAST  Result Date: 3/7/2025  Bilateral upper and lower lobe consolidative opacities, larger within the right upper lobe. Within the left lower lobe, these were present on study from 9/9/2023 and have improved. The remainder have worsened or developed. This finding likely reflects an infectious/inflammatory process and short-term follow-up after appropriate interval therapy after 1-3 months would be suggested. Trace right effusion. Electronically signed by Xavier Roman    XR CHEST (2 VW)  Result Date: 3/6/2025  Mass versus consolidation in the posterior/inferior right upper lobe, likely crossing into the right middle lobe.  18 -- --   03/08/25 1214 -- -- 18 -- --   03/08/25 1126 98.2 °F (36.8 °C) 90 18 118/73 95 %   03/08/25 0728 97.9 °F (36.6 °C) 80 17 (!) 140/78 97 %   03/08/25 0335 98.1 °F (36.7 °C) 92 21 117/81 94 %   03/08/25 0045 -- -- 18 -- --   03/07/25 2352 98.1 °F (36.7 °C) 97 19 138/84 97 %   03/07/25 2349 -- -- 18 -- --   03/07/25 1928 98.1 °F (36.7 °C) 87 17 112/82 99 %   03/07/25 1554 97.9 °F (36.6 °C) 92 -- 115/73 97 %       Oxygen Therapy  SpO2: 95 %  Pulse via Oximetry: 92 beats per minute  O2 Device: None (Room air)    Estimated body mass index is 20.53 kg/m² as calculated from the following:    Height as of this encounter: 1.727 m (5' 8\").    Weight as of this encounter: 61.2 kg (135 lb).    Intake/Output Summary (Last 24 hours) at 3/8/2025 1417  Last data filed at 3/8/2025 0728  Gross per 24 hour   Intake 976.5 ml   Output --   Net 976.5 ml         Physical Exam:    General:    Well nourished.  No overt distress  Head:  Normocephalic, atraumatic  Eyes:  Sclerae appear normal.  Pupils equally round.    HENT:  Nares appear normal, no drainage.  Moist mucous membranes  Neck:  No restricted ROM.  Trachea midline  CV:   RRR.  No m/r/g.  No JVD  Lungs:   CTAB.  No wheezing.  Respirations even, unlabored  Abdomen:   Soft, nontender, nondistended.    Extremities: Warm and dry.   No edema.    Skin:     No rashes.  Normal coloration  Neuro:  CN II-XII grossly intact.  Psych:  Normal mood and affect.        Time spent in patient discharge and coordination 36 minutes.      Signed:  Ariella Crowley MD    Part of this note may have been written by using a voice dictation software.  The note has been proof read but may still contain some grammatical/other typographical errors.

## 2025-03-08 NOTE — DISCHARGE INSTRUCTIONS
DISCHARGE INSTRUCTIONS:  - Please take medication as prescribed.  - Please follow-up with your primary care physician within 1 month of discharge as needed  - Please follow up with [facility] - for bronchoscopy with [facility] on Wednesday

## 2025-03-08 NOTE — PROGRESS NOTES
Pablo Rodriguez  Admission Date: 3/6/2025         Daily Progress Note: 3/8/2025    Attending Attestation- Pulmonary & Critical Care    In this split/shared evaluation I performed reviewed the patients's H&P, available images, labs, cultures., discussed case in detail with NPP, performed a medically appropriate history and exam, counseled and educated the patient and/or family member, ordered and/or reviewed medications, tests or procedures, documented information in EMR, independently interpreted images, and coordinated care. which accounted for 20 minutes clinical time.     Impression: Much denser RUL consolidation in wedgelike distribution.  Continues to have R-sided chest wall pain.  Pneumonia is possible but the symptoms have been minimal and noninfectious considerations require further evaluation.  Pt not requiring oxygen, doesn't have fevers and is tired of being in the hospital.      Recommendations:   Will r/o PE today with CTPE.  If no PE, would discharge her home today.  She is being set up for an outpatient bronchoscopy with TBBX on Wednesday.  Would like to r/o  and fully investigate these recurring infiltrates, which have a broad ddx.  Complete abx for CAP- but extremely unusual presentation and history argue against simple CAP.    Janice Forte MD  _____________________________________________________________________________________________      Background:   Patient is a 64 y.o.  female seen and evaluated at the request of Dr. Perdomo.    She reported having had a significant headache for about a week and was seen in the ER. She also reported a several day history of right sided/lateral chest pain that sounded pleuritic in nature. She denied cough, fevers, and congestion; She endorsed chills, shortness of breath and poor oral intake secondary to headache.     She was seen in the ED 3/5 with the same complaint and had a Head CT that showed possible diffuse cerebral  & 3% saline nebs for pulmonary clearance     More than 50% of the time documented was spent in face-to-face contact with the patient and in the care of the patient on the floor/unit where the patient is located.    In this split/shared evaluation I performed performed a medically appropriate history and exam, counseled and educated the patient and/or family member, ordered medications, tests or procedures, documented information in EMR, and coordinated care. which accounted for 22 minutes of clinical time.     Priscila Odell Head, FNP     Patent

## 2025-03-08 NOTE — H&P (VIEW-ONLY)
Pablo Rodriguez  Admission Date: 3/6/2025         Daily Progress Note: 3/8/2025    Attending Attestation- Pulmonary & Critical Care    In this split/shared evaluation I performed reviewed the patients's H&P, available images, labs, cultures., discussed case in detail with NPP, performed a medically appropriate history and exam, counseled and educated the patient and/or family member, ordered and/or reviewed medications, tests or procedures, documented information in EMR, independently interpreted images, and coordinated care. which accounted for 20 minutes clinical time.     Impression: Much denser RUL consolidation in wedgelike distribution.  Continues to have R-sided chest wall pain.  Pneumonia is possible but the symptoms have been minimal and noninfectious considerations require further evaluation.  Pt not requiring oxygen, doesn't have fevers and is tired of being in the hospital.      Recommendations:   Will r/o PE today with CTPE.  If no PE, would discharge her home today.  She is being set up for an outpatient bronchoscopy with TBBX on Wednesday.  Would like to r/o  and fully investigate these recurring infiltrates, which have a broad ddx.  Complete abx for CAP- but extremely unusual presentation and history argue against simple CAP.    Janice Forte MD  _____________________________________________________________________________________________      Background:   Patient is a 64 y.o.  female seen and evaluated at the request of Dr. Perdomo.    She reported having had a significant headache for about a week and was seen in the ER. She also reported a several day history of right sided/lateral chest pain that sounded pleuritic in nature. She denied cough, fevers, and congestion; She endorsed chills, shortness of breath and poor oral intake secondary to headache.     She was seen in the ED 3/5 with the same complaint and had a Head CT that showed possible diffuse cerebral  mEq/100 mL IVPB (Peripheral Line)  10 mEq IntraVENous PRN    magnesium sulfate 2000 mg in 50 mL IVPB premix  2,000 mg IntraVENous PRN    ondansetron (ZOFRAN-ODT) disintegrating tablet 4 mg  4 mg Oral Q8H PRN    Or    ondansetron (ZOFRAN) injection 4 mg  4 mg IntraVENous Q6H PRN    polyethylene glycol (GLYCOLAX) packet 17 g  17 g Oral Daily PRN    acetaminophen (TYLENOL) tablet 650 mg  650 mg Oral Q6H PRN    Or    acetaminophen (TYLENOL) suppository 650 mg  650 mg Rectal Q6H PRN    azithromycin (ZITHROMAX) 500 mg in sodium chloride 0.9 % 250 mL IVPB (Tegr7Axn)  500 mg IntraVENous Q24H    cefTRIAXone (ROCEPHIN) 1,000 mg in sterile water 10 mL IV syringe  1,000 mg IntraVENous Q24H    guaiFENesin (ROBITUSSIN) 100 MG/5ML liquid 200 mg  200 mg Oral Q4H PRN     Review of Systems: Comprehensive ROS negative except in HPI  Objective:   Blood pressure (!) 140/78, pulse 80, temperature 97.9 °F (36.6 °C), temperature source Oral, resp. rate 17, height 1.727 m (5' 8\"), weight 61.2 kg (135 lb), SpO2 97%.   Intake/Output Summary (Last 24 hours) at 3/8/2025 0954  Last data filed at 3/8/2025 0728  Gross per 24 hour   Intake 1213.5 ml   Output --   Net 1213.5 ml     Physical Exam:   Constitutional:  the patient is well developed and in no acute distress  EENMT:  sclera clear, pupils equal, oral mucosa moist  Respiratory: symmetric chest rise. CTA throughout, no respiratory distress  Cardiovascular:  RRR without M,G,R.  Musculoskeletal: warm without cyanosis. Normal muscle tone.   Skin:  no jaundice or rashes, no visible wounds   Neurologic: symmetric strength, fluent speech  Psychiatric:  calm, appropriate, oriented x 4    Imaging: I performed an independent interpretation of the patient's images.  CXR:   3/6/25    CT Chest:  3/7/25        LAB:  Recent Labs     03/05/25  1003 03/06/25  1416 03/07/25  0340   WBC 13.9* 17.0* 14.2*   HGB 13.6 13.3 11.0*   HCT 39.6 38.3 32.8*    183 159   PROCAL  --  4.32*  --      Recent Labs

## 2025-03-08 NOTE — CARE COORDINATION
Discharge order is in. Pt is discharging home today in stable condition. No discharge needs identified by CM. Tx goals met.     03/08/25 1502   Services At/After Discharge   Transition of Care Consult (CM Consult) Discharge Planning   Services At/After Discharge None   [place] Resource Information Provided? No   Mode of Transport at Discharge Other (see comment)  (Family)   Confirm Follow Up Transport Family   Condition of Participation: Discharge Planning   The Patient and/or Patient Representative was provided with a Choice of Provider? Patient   The Patient and/Or Patient Representative agree with the Discharge Plan? Yes   Freedom of Choice list was provided with basic dialogue that supports the patient's individualized plan of care/goals, treatment preferences, and shares the quality data associated with the providers?  Yes

## 2025-03-09 LAB
BACTERIA SPEC CULT: NORMAL
SERVICE CMNT-IMP: NORMAL

## 2025-03-10 NOTE — PERIOP NOTE
Patient verified name, , and procedure.    Type: 1B; abbreviated assessment per anesthesia guidelines.    Labs per anesthesia: None.  EKG: Not required per anesthesia guidelines.    Instructed pt that they will be notified the day before their procedure by the GI Lab for time of arrival. Arrival times should be called by 5 pm. If no phone is received, the patient should contact the GI Lab. The GI Lab telephone number is [phone].     Follow diet and prep instructions per office, including NPO status.    Bath or shower the night before and the am of surgery with non-moisturizing soap. No lotions, oils, powders, perfumes, or cologne on skin. No make up, eye make up or jewelry. Wear loose fitting comfortable, clean clothing.     Must have adult present in building the entire time .     Medications to take on the day of procedure: None    Please hold all vitamins x 7 days prior to procedure and NSAIDS (Aspirin, Excedrin, Goody powders, Motrin, Ibuprofen, Advil, Aleve and Naproxen) x 5 days prior to procedure. Should you have a procedure date that does not allow for the amount of time instructed above, please stop taking vitamins, supplements, and NSAIDS IMMEDIATELY.         The following discharge instructions reviewed with patient: medication given during procedure may cause drowsiness for several hours, therefore, do not drive or operate machinery for remainder of the day, no alcohol on the day of your procedure, resume regular diet and activity unless otherwise directed, for mild sore throat you may use Cepacol throat lozenges or warm salt water gargles as needed, call your physician for any problems or questions. Patient verbalizes understanding.

## 2025-03-11 ENCOUNTER — ANESTHESIA EVENT (OUTPATIENT)
Dept: ENDOSCOPY | Age: 65
End: 2025-03-11
Payer: COMMERCIAL

## 2025-03-12 ENCOUNTER — APPOINTMENT (OUTPATIENT)
Dept: GENERAL RADIOLOGY | Age: 65
End: 2025-03-12
Attending: INTERNAL MEDICINE
Payer: COMMERCIAL

## 2025-03-12 ENCOUNTER — ANESTHESIA (OUTPATIENT)
Dept: ENDOSCOPY | Age: 65
End: 2025-03-12
Payer: COMMERCIAL

## 2025-03-12 ENCOUNTER — HOSPITAL ENCOUNTER (OUTPATIENT)
Age: 65
Discharge: HOME | End: 2025-03-12
Attending: INTERNAL MEDICINE | Admitting: INTERNAL MEDICINE
Payer: COMMERCIAL

## 2025-03-12 VITALS
HEART RATE: 85 BPM | DIASTOLIC BLOOD PRESSURE: 73 MMHG | BODY MASS INDEX: 20.44 KG/M2 | TEMPERATURE: 97.5 F | OXYGEN SATURATION: 93 % | RESPIRATION RATE: 18 BRPM | WEIGHT: 130.2 LBS | HEIGHT: 67 IN | SYSTOLIC BLOOD PRESSURE: 124 MMHG

## 2025-03-12 DIAGNOSIS — R93.89 ABNORMAL CT OF THE CHEST: ICD-10-CM

## 2025-03-12 LAB
DIFFERENTIAL: NORMAL
EOSINOPHIL NFR BRONCH MANUAL: 0 %
LYMPHOCYTES NFR BRONCH MANUAL: 32 %
MACROPHAGES NFR BRONCH MANUAL: 38 %
NEUTROPHILS NFR BRONCH MANUAL: 30 %

## 2025-03-12 PROCEDURE — 88184 FLOWCYTOMETRY/ TC 1 MARKER: CPT

## 2025-03-12 PROCEDURE — 87541 LEGION PNEUMO DNA AMP PROB: CPT

## 2025-03-12 PROCEDURE — 89051 BODY FLUID CELL COUNT: CPT

## 2025-03-12 PROCEDURE — 7100000010 HC PHASE II RECOVERY - FIRST 15 MIN: Performed by: INTERNAL MEDICINE

## 2025-03-12 PROCEDURE — 87486 CHLMYD PNEUM DNA AMP PROBE: CPT

## 2025-03-12 PROCEDURE — 3700000001 HC ADD 15 MINUTES (ANESTHESIA): Performed by: INTERNAL MEDICINE

## 2025-03-12 PROCEDURE — 87206 SMEAR FLUORESCENT/ACID STAI: CPT

## 2025-03-12 PROCEDURE — 2709999900 HC NON-CHARGEABLE SUPPLY: Performed by: INTERNAL MEDICINE

## 2025-03-12 PROCEDURE — 6360000002 HC RX W HCPCS

## 2025-03-12 PROCEDURE — 87102 FUNGUS ISOLATION CULTURE: CPT

## 2025-03-12 PROCEDURE — 87581 M.PNEUMON DNA AMP PROBE: CPT

## 2025-03-12 PROCEDURE — 31628 BRONCHOSCOPY/LUNG BX EACH: CPT | Performed by: INTERNAL MEDICINE

## 2025-03-12 PROCEDURE — 88305 TISSUE EXAM BY PATHOLOGIST: CPT

## 2025-03-12 PROCEDURE — 7100000001 HC PACU RECOVERY - ADDTL 15 MIN: Performed by: INTERNAL MEDICINE

## 2025-03-12 PROCEDURE — 2580000003 HC RX 258: Performed by: STUDENT IN AN ORGANIZED HEALTH CARE EDUCATION/TRAINING PROGRAM

## 2025-03-12 PROCEDURE — 88185 FLOWCYTOMETRY/TC ADD-ON: CPT

## 2025-03-12 PROCEDURE — 87116 MYCOBACTERIA CULTURE: CPT

## 2025-03-12 PROCEDURE — 87070 CULTURE OTHR SPECIMN AEROBIC: CPT

## 2025-03-12 PROCEDURE — 31624 DX BRONCHOSCOPE/LAVAGE: CPT | Performed by: INTERNAL MEDICINE

## 2025-03-12 PROCEDURE — 88312 SPECIAL STAINS GROUP 1: CPT

## 2025-03-12 PROCEDURE — 87205 SMEAR GRAM STAIN: CPT

## 2025-03-12 PROCEDURE — 3700000000 HC ANESTHESIA ATTENDED CARE: Performed by: INTERNAL MEDICINE

## 2025-03-12 PROCEDURE — 3609011800 HC BRONCHOSCOPY/TRANSBRONCHIAL LUNG BIOPSY: Performed by: INTERNAL MEDICINE

## 2025-03-12 PROCEDURE — 7100000000 HC PACU RECOVERY - FIRST 15 MIN: Performed by: INTERNAL MEDICINE

## 2025-03-12 RX ORDER — LIDOCAINE HYDROCHLORIDE 20 MG/ML
INJECTION, SOLUTION EPIDURAL; INFILTRATION; INTRACAUDAL; PERINEURAL
Status: DISCONTINUED | OUTPATIENT
Start: 2025-03-12 | End: 2025-03-12 | Stop reason: SDUPTHER

## 2025-03-12 RX ORDER — PROPOFOL 10 MG/ML
INJECTION, EMULSION INTRAVENOUS
Status: DISCONTINUED | OUTPATIENT
Start: 2025-03-12 | End: 2025-03-12 | Stop reason: SDUPTHER

## 2025-03-12 RX ORDER — SODIUM CHLORIDE, SODIUM LACTATE, POTASSIUM CHLORIDE, CALCIUM CHLORIDE 600; 310; 30; 20 MG/100ML; MG/100ML; MG/100ML; MG/100ML
INJECTION, SOLUTION INTRAVENOUS CONTINUOUS
Status: DISCONTINUED | OUTPATIENT
Start: 2025-03-12 | End: 2025-03-12 | Stop reason: HOSPADM

## 2025-03-12 RX ADMIN — PROPOFOL 150 MG: 10 INJECTION, EMULSION INTRAVENOUS at 10:14

## 2025-03-12 RX ADMIN — SODIUM CHLORIDE, POTASSIUM CHLORIDE, SODIUM LACTATE AND CALCIUM CHLORIDE: 600; 310; 30; 20 INJECTION, SOLUTION INTRAVENOUS at 09:15

## 2025-03-12 RX ADMIN — PROPOFOL 50 MG: 10 INJECTION, EMULSION INTRAVENOUS at 10:21

## 2025-03-12 RX ADMIN — LIDOCAINE HYDROCHLORIDE 60 MG: 20 INJECTION, SOLUTION EPIDURAL; INFILTRATION; INTRACAUDAL; PERINEURAL at 10:13

## 2025-03-12 ASSESSMENT — PAIN - FUNCTIONAL ASSESSMENT: PAIN_FUNCTIONAL_ASSESSMENT: 0-10

## 2025-03-12 ASSESSMENT — PAIN SCALES - GENERAL: PAINLEVEL_OUTOF10: 0

## 2025-03-12 ASSESSMENT — PAIN DESCRIPTION - DESCRIPTORS: DESCRIPTORS: STABBING

## 2025-03-12 NOTE — ANESTHESIA POSTPROCEDURE EVALUATION
Department of Anesthesiology  Postprocedure Note    Patient: Pablo Rodriguez  MRN: [id number]  YOB: 1960  Date of evaluation: 3/12/2025    Procedure Summary       Date: 03/12/25 Room / Location: [facility] ENDO [facility] / [facility] ENDOSCOPY    Anesthesia Start: 1009 Anesthesia Stop: 1045    Procedure: BRONCHOSCOPY/TRANSBRONCHIAL LUNG BIOPSY (Chest) Diagnosis:       Abnormal CT of the chest      (Abnormal CT of the chest [R93.89])    Surgeons: Vin Everett MD Responsible Provider: Edwar Dos Santos MD    Anesthesia Type: general ASA Status: 3            Anesthesia Type: No value filed.    Gia Phase I: Gia Score: 10    Gia Phase II: Gia Score: 10    Anesthesia Post Evaluation    Patient location during evaluation: bedside  Patient participation: complete - patient participated  Level of consciousness: awake and alert  Airway patency: patent  Nausea & Vomiting: no vomiting  Cardiovascular status: hemodynamically stable  Respiratory status: acceptable  Hydration status: euvolemic  Pain management: adequate    No notable events documented.

## 2025-03-12 NOTE — ANESTHESIA PROCEDURE NOTES
Airway  Date/Time: 3/12/2025 10:15 AM  Urgency: elective    Airway not difficult    General Information and Staff    Patient location during procedure: OR  Performed: resident/CRNA   Performed by: Nadiya Nair APRN - CRNA  Authorized by: Edwar Dos Santos MD      Indications and Patient Condition  Indications for airway management: anesthesia  Spontaneous Ventilation: absent  Sedation level: deep  Preoxygenated: yes  Patient position: sniffing  Mask difficulty assessment: vent by bag mask    Final Airway Details  Final airway type: supraglottic airway      Successful airway: oropharyngeal  Size 4     Number of attempts at approach: 1  Ventilation between attempts: supraglottic airway  Number of other approaches attempted: 0

## 2025-03-12 NOTE — OP NOTE
PROCEDURE: Broncho-Aveolar Lavage (CPT 26193)  and Transbronchial Lung Biopsy (CPT 42463)    EQUIPMENT: Olympus  Bronchoscope    INDICATION   Pulmonary infiltrates    ANESTHESIA    Please see anesthesiology medication record.    IMAGING:  3/8/25        AIRWAY INSPECTION    After obtaining informed consent, a Olympus  Bronchoscope was introduced into the trachea without complication.         RIGHT    LOCATION NORM/ABNORM DESCRIPTION   Larynx NL    VOCAL CORDS NL    TRACHEA NL    FRANKLIN NL    RMSB NL    RUL NL    BI NL    RML NL    RLL NL    SUP SEGM RLL NL    MED BASAL NL    ANTERIOR BASAL NL    LATERAL BASAL NL    POSTERIOR BASAL NL               LEFT    LOCATION NORM/ABNORM DESCRIPTION   LMSB NL    NOE NL    LINGULA NL    LLL NL    SUPERIOR SEG LLL NL    PEARL-MEDIAL LLL NL    LATERAL LLL NL    POSTERIOR LLL NL      The following samples were obtained:    BAL: RML - cell count, diff, routine culture, afb, fungus, cytology, CD4:CD8, atypical pna, legionella.     Transbronchial Lung Bx: RUL x 10 for histology      The procedure was completed without complication and the patient tolerated the procedure well.    EBL: <10cc    Recommendations:  Follow up the above studies and will need repeat CT in about 6 weeks.   Will need office follow up appt as well. Ideally in the next 2-4 weeks with NP.     Vin Everett MD

## 2025-03-12 NOTE — ANESTHESIA PRE PROCEDURE
Department of Anesthesiology  Preprocedure Note       Name:  Pablo Rodriguez   Age:  64 y.o.  :  1960                                          MRN:  [id number]         Date:  3/12/2025      Surgeon: Surgeon(s):  Vin Everett MD    Procedure: Procedure(s):  BRONCHOSCOPY/TRANSBRONCHIAL LUNG BIOPSY    Medications prior to admission:   Prior to Admission medications    Medication Sig Start Date End Date Taking? Authorizing Provider   guaiFENesin (MUCINEX) 600 MG extended release tablet Take 2 tablets by mouth 2 times daily for 7 days 3/8/25 3/15/25 Yes Ariella Crowley MD   cefpodoxime (VANTIN) 200 MG tablet Take 1 tablet by mouth 2 times daily for 3 days 3/9/25 3/12/25 Yes Ariella Crowley MD   Acetylcysteine (NAC) 500 MG CAPS Take by mouth   Yes ProviderCarlo MD   Quercetin 500 MG CAPS Take 500 mg by mouth daily   Yes ProviderCarlo MD   Albuterol-Budesonide (AIRSUPRA) 90-80 MCG/ACT AERO Inhale 2 puffs into the lungs every 6 hours as needed (wheezing, cough, shortness of breath, chest tightness.) 24  Yes Haydee Glover, APRN - CNP   Bacillus Coagulans-Inulin (PROBIOTIC) 1-250 BILLION-MG CAPS Take by mouth   Yes ProviderCarlo MD   Ascorbic Acid (VITAMIN C) 1000 MG tablet Take 1 tablet by mouth daily   Yes ProviderCarlo MD   aspirin 81 MG EC tablet Take 1 tablet by mouth daily  Patient not taking: Reported on 3/6/2025 3/5/25   Claudia Ta PA-C   clopidogrel (PLAVIX) 75 MG tablet Take 1 tablet by mouth daily  Patient not taking: Reported on 3/6/2025 3/5/25   Claudia Ta PA-C   butalbital-acetaminophen-caffeine (FIORICET, ESGIC) -40 MG per tablet Take 1 tablet by mouth every 4 hours as needed for Headaches  Patient not taking: Reported on 3/10/2025 3/5/25   Claudia Ta PA-C   sodium chloride, Inhalant, 3 % nebulizer solution Take 4 mLs by nebulization in the morning, at noon, and at bedtime 24   Chey Thurston, TING - CNP       Current

## 2025-03-12 NOTE — INTERVAL H&P NOTE
Update History & Physical    The patient's History and Physical of March 8,  was reviewed with the patient and I examined the patient. There was no change. The surgical site was confirmed by the patient and me.     Plan: The risks, benefits, expected outcome, and alternative to the recommended procedure have been discussed with the patient. Patient understands and wants to proceed with the procedure.     Electronically signed by Vin Everett MD on 3/12/2025 at 10:08 AM

## 2025-03-12 NOTE — DISCHARGE INSTRUCTIONS
RESPIRATORY CARE - BRONCHOSCOPY - DISCHARGE INSTRUCTIONS      You received a lot of numbing medication for your throat and nose, and you also received medication to make you sleepy during your procedure.  Because of this and because the bronchoscopy may have irritated your airways, we ask that you follow these directions:    1.         Do not eat or drink for 2 hours after your procedure .   After that, you may have what you please.               You may want to try some liquids first, because your throat may be a little sore.    2. Medication may cause drowsiness for several hours, therefore, do not drive or operate machinery for remainder of the day.  No alcohol today.    3. You may cough up more mucus than usual and you may see some blood, but this is expected and should subside by the following day.    4. If severe throat irritation, coughing, or bleeding continue, call your doctor.    5.         If you run a fever greater than 102, call [facility] at [phone].          After general anesthesia or intravenous sedation, for 24 hours or while taking prescription Narcotics:  Limit your activities  A responsible adult needs to be with you for the next 24 hours  Do not drive and operate hazardous machinery  Do not make important personal or business decisions  Do not drink alcoholic beverages  If you have not urinated within 8 hours after discharge, and you are experiencing discomfort from urinary retention, please go to the nearest ED.  If you have sleep apnea and have a CPAP machine, please use it for all naps and sleeping.  Please use caution when taking narcotics and any of your home medications that may cause drowsiness.  *  Please give a list of your current medications to your Primary Care Provider.  *  Please update this list whenever your medications are discontinued, doses are      changed, or new medications (including over-the-counter products) are added.  *  Please carry medication information at  all times in case of emergency situations.    These are general instructions for a healthy lifestyle:  No smoking/ No tobacco products/ Avoid exposure to second hand smoke  Surgeon General's Warning:  Quitting smoking now greatly reduces serious risk to your health.  Obesity, smoking, and sedentary lifestyle greatly increases your risk for illness  A healthy diet, regular physical exercise & weight monitoring are important for maintaining a healthy lifestyle    You may be retaining fluid if you have a history of heart failure or if you experience any of the following symptoms:  Weight gain of 3 pounds or more overnight or 5 pounds in a week, increased swelling in our hands or feet or shortness of breath while lying flat in bed.  Please call your doctor as soon as you notice any of these symptoms; do not wait until your next office visit.

## 2025-03-13 LAB
BACTERIA SPEC CULT: NORMAL
CCP IGA+IGG SERPL IA-ACNC: 3 UNITS (ref 0–19)
CENTROMERE B AB SER-ACNC: <0.2 AI (ref 0–0.9)
CHROMATIN AB SERPL-ACNC: <0.2 AI (ref 0–0.9)
DSDNA AB SER-ACNC: 4 IU/ML (ref 0–9)
ENA JO1 AB SER-ACNC: <0.2 AI (ref 0–0.9)
ENA RNP AB SER-ACNC: <0.2 AI (ref 0–0.9)
ENA SCL70 AB SER-ACNC: <0.2 AI (ref 0–0.9)
ENA SM AB SER-ACNC: <0.2 AI (ref 0–0.9)
ENA SS-A AB SER-ACNC: <0.2 AI (ref 0–0.9)
ENA SS-B AB SER-ACNC: <0.2 AI (ref 0–0.9)
Lab: NORMAL
RHEUMATOID FACT SER QL LA: NEGATIVE
SERVICE CMNT-IMP: NORMAL

## 2025-03-14 LAB
BACTERIA SPEC CULT: NORMAL
C PNEUMONIAE PCR: NOT DETECTED
GRAM STN SPEC: NORMAL
L PNEUMO DNA LOWER RESP QL NAA+PROBE: NOT DETECTED
LEGIONELLA DNA SPEC NAA+PROBE: NOT DETECTED
MYCOPLASMA PNEUMONIAE PCR: NOT DETECTED
SERVICE CMNT-IMP: NORMAL

## 2025-03-15 LAB
ACID FAST STN SPEC: NEGATIVE
FUNGAL CULT/SMEAR: NORMAL
FUNGUS SMEAR: NORMAL
MYCOBACTERIUM SPEC QL CULT: NORMAL
SPECIMEN PREPARATION: NORMAL
SPECIMEN PROCESSING: NORMAL
SPECIMEN SOURCE: NORMAL

## 2025-03-17 ENCOUNTER — TELEPHONE (OUTPATIENT)
Dept: PULMONOLOGY | Age: 65
End: 2025-03-17

## 2025-03-17 DIAGNOSIS — R91.8 ABNORMAL CT SCAN OF LUNG: ICD-10-CM

## 2025-03-17 DIAGNOSIS — R05.3 CHRONIC COUGH: ICD-10-CM

## 2025-03-17 DIAGNOSIS — Z86.19 FREQUENT INFECTIONS: ICD-10-CM

## 2025-03-17 DIAGNOSIS — A31.0 MYCOBACTERIUM AVIUM COMPLEX (HCC): Primary | ICD-10-CM

## 2025-03-17 DIAGNOSIS — R91.8 PULMONARY INFILTRATE: ICD-10-CM

## 2025-03-17 LAB
FUNGUS SMEAR: NORMAL
SPECIMEN SOURCE: NORMAL

## 2025-03-17 NOTE — TELEPHONE ENCOUNTER
Recommendations:  Follow up the above studies and will need repeat CT in about 6 weeks.   Will need office follow up appt as well. Ideally in the next 2-4 weeks with NP.      Vin Everett MD    I have spoken with patient. She allows me to schedule her to see Mrs Glover 4/2 at 1040 arrival for bronchoscopy results.  She is aware that per Dr Everett, she will need repeat CT chest in about 6 weeks.  Order in EPIC for this and she is aware of appropriate date for this image. Notification to Mrs Glover through [facility] that majority of bronchoscopy results available.

## 2025-03-18 ENCOUNTER — RESULTS FOLLOW-UP (OUTPATIENT)
Dept: MEDSURG UNIT | Age: 65
End: 2025-03-18

## 2025-03-18 LAB
Lab: NORMAL
Lab: NORMAL
REFERENCE LAB: NORMAL

## 2025-03-21 NOTE — PROGRESS NOTES
Physician Progress Note      PATIENT:               HAO TAYLOR  CSN #:                  [id number]  :                       1960  ADMIT DATE:       3/6/2025 2:13 PM  DISCH DATE:        3/8/2025 3:40 PM  RESPONDING  PROVIDER #:        Ariella Crowley MD          QUERY TEXT:    Patient admitted with pneumonia. Noted documentation of sepsis in progress   notes and discharge summary. In order to support the diagnosis of sepsis,   please include additional clinical indicators in your documentation.  Or   please document if the diagnosis of sepsis has been ruled out after further   study    The medical record reflects the following:  Risk Factors: pneumonia  Clinical Indicators: presented with PNA on 3/6 and was discharged home on 3/8,   noted to be recurrent and on arrival WBC 17.0, PCT 4.32, lactic acid sepsis   1.2, temp 98.6, HR , RR 16-21, which resolved after day 1, weighs 61.2   kg and received 1,000 ml bolus  Treatment: 1,000 mL IVF bolus, labs, imaging, monitoring, Zithromax and   Rocephin IV  Options provided:  -- Sepsis present as evidenced by, Please document evidence.  -- Sepsis was ruled out after study  -- Other - I will add my own diagnosis  -- Disagree - Not applicable / Not valid  -- Disagree - Clinically unable to determine / Unknown  -- Refer to Clinical Documentation Reviewer    PROVIDER RESPONSE TEXT:    Sepsis is present as evidenced by per provider who was seeing pt prior to my   encounter: Met sepsis criteria with Tachycardia, Tachypnea, Leukocytosis, and   infection    Query created by: Maeve Mary on 3/21/2025 10:21 AM      QUERY TEXT:    Pt admitted with pneumonia. Pt noted to have recurrent PNA with known MAC and   to be on Rocephin and discharged on Vantin. If possible, please document in   the progress notes and discharge summary if you are evaluating and/or treating   any of the following:    Note: CAP and HCAP indicate where the pneumonia was acquired, not a specific

## 2025-04-02 ENCOUNTER — OFFICE VISIT (OUTPATIENT)
Dept: PULMONOLOGY | Age: 65
End: 2025-04-02
Payer: COMMERCIAL

## 2025-04-02 VITALS
WEIGHT: 136 LBS | OXYGEN SATURATION: 98 % | TEMPERATURE: 97.5 F | RESPIRATION RATE: 16 BRPM | SYSTOLIC BLOOD PRESSURE: 111 MMHG | HEART RATE: 67 BPM | DIASTOLIC BLOOD PRESSURE: 71 MMHG | BODY MASS INDEX: 21.35 KG/M2 | HEIGHT: 67 IN

## 2025-04-02 DIAGNOSIS — Z86.19 HISTORY OF MAC INFECTION: ICD-10-CM

## 2025-04-02 DIAGNOSIS — Z09 HOSPITAL DISCHARGE FOLLOW-UP: Primary | ICD-10-CM

## 2025-04-02 DIAGNOSIS — R05.3 CHRONIC COUGH: ICD-10-CM

## 2025-04-02 DIAGNOSIS — J84.89 ORGANIZING PNEUMONIA (HCC): ICD-10-CM

## 2025-04-02 LAB
EXPIRATORY TIME: NORMAL
FEF 25-75% %PRED-PRE: NORMAL
FEF 25-75% PRED: NORMAL
FEF 25-75-PRE: NORMAL
FEV1 %PRED-PRE: 61 %
FEV1 PRED: 2.67 L
FEV1/FVC %PRED-PRE: NORMAL
FEV1/FVC PRED: NORMAL
FEV1/FVC: 71 %
FEV1: 1.62 L
FVC %PRED-PRE: 67 %
FVC PRED: 3.42 L
FVC: 2.27 L
PEF %PRED-PRE: NORMAL
PEF PRED: NORMAL
PEF-PRE: NORMAL

## 2025-04-02 PROCEDURE — 94010 BREATHING CAPACITY TEST: CPT | Performed by: INTERNAL MEDICINE

## 2025-04-02 PROCEDURE — 99214 OFFICE O/P EST MOD 30 MIN: CPT | Performed by: NURSE PRACTITIONER

## 2025-04-02 RX ORDER — SODIUM CHLORIDE FOR INHALATION 3 %
4 VIAL, NEBULIZER (ML) INHALATION 3 TIMES DAILY
Qty: 360 ML | Refills: 3 | Status: SHIPPED | OUTPATIENT
Start: 2025-04-02

## 2025-04-02 RX ORDER — ALBUTEROL SULFATE AND BUDESONIDE 90; 80 UG/1; UG/1
2 AEROSOL, METERED RESPIRATORY (INHALATION) EVERY 6 HOURS PRN
Qty: 32.1 G | Refills: 3 | Status: SHIPPED | OUTPATIENT
Start: 2025-04-02

## 2025-04-02 ASSESSMENT — PULMONARY FUNCTION TESTS
FVC_PREDICTED: 3.42
FVC: 2.27
FVC_PERCENT_PREDICTED_PRE: 67
FEV1: 1.62
FEV1/FVC: 71
FEV1_PERCENT_PREDICTED_PRE: 61
FEV1_PREDICTED: 2.67

## 2025-04-02 NOTE — PROGRESS NOTES
Name:  Pablo Rodriguez  YOB: 1960   MRN: [id number]      Office Visit: 4/2/2025      ASSESSMENT AND PLAN:  (Medical Decision Making)    Impression: 64 y.o. female with a history of multiple bouts of \"pneumonia and pneumonitis\" with chronic cough during these episodes that seem to be precipitated by mold exposure.  Now with 2 positive sputum cultures + MAC    1. Hospital Follow up  2.Abnormal CT scan of lung  --admitted for abnormal CTA, concern for PNA with wedge shaped density in R lung with tree and bud haziness in other areas however, symptoms not consistent PNA.  Treated with antibiotics and symptoms improved. No steroids used due to hx of reaction to prednisone.    --bronch with TBBx showed organizing PNA, negative cultures, cytology negative.  Autoimmune work up negative.  AFB smear negative, culture pending.  Hx of rash with prednisone.  Immune deficiency completed by [facility] without current findings.    --Discussed with Dr Everett and info on macrolide therapy discussed.  She prefers to see if repeat CT better and would like more info on macrolide if ongoing issues.  Unfortunately she has had several CT scans with tree-in-bud findings, infiltrates etc. Over the last 2 years, so suspect this may need treatment  --spirometry today with obstruction.  Instructed to restart nebulizer with 3% and air supra.    3. Chronic cough    4. Hx of Mycobacterium Avium Complex  -- With 2 positive sputum's and significant bronchiolitis throughout CT, recurrent infections gave the patient the opportunity to consider therapy, however, she declined.  Appetite is good, minimal sputum production and overall feels well.  Pending AFB from her bronchoscopy        No orders of the defined types were placed in this encounter.    No orders of the defined types were placed in this encounter.      Haydee Glover, TING - CNP    Collaborating physician is Jason Flynn,

## 2025-04-05 PROBLEM — N39.0 UTI (URINARY TRACT INFECTION): Status: RESOLVED | Noted: 2025-03-06 | Resolved: 2025-04-05

## 2025-04-29 ENCOUNTER — OFFICE VISIT (OUTPATIENT)
Dept: PULMONOLOGY | Age: 65
End: 2025-04-29
Payer: COMMERCIAL

## 2025-04-29 VITALS
RESPIRATION RATE: 18 BRPM | HEART RATE: 67 BPM | HEIGHT: 67 IN | SYSTOLIC BLOOD PRESSURE: 130 MMHG | TEMPERATURE: 97.5 F | DIASTOLIC BLOOD PRESSURE: 84 MMHG | BODY MASS INDEX: 21.46 KG/M2 | OXYGEN SATURATION: 98 % | WEIGHT: 136.7 LBS

## 2025-04-29 DIAGNOSIS — Z86.19 HISTORY OF MAC INFECTION: ICD-10-CM

## 2025-04-29 DIAGNOSIS — R05.3 CHRONIC COUGH: ICD-10-CM

## 2025-04-29 DIAGNOSIS — J84.89 ORGANIZING PNEUMONIA (HCC): Primary | ICD-10-CM

## 2025-04-29 PROCEDURE — 99214 OFFICE O/P EST MOD 30 MIN: CPT | Performed by: INTERNAL MEDICINE

## 2025-04-29 PROCEDURE — G2211 COMPLEX E/M VISIT ADD ON: HCPCS | Performed by: INTERNAL MEDICINE

## 2025-04-29 NOTE — PROGRESS NOTES
Name:  Pablo Rodriguez  YOB: 1960   MRN: [id number]      Office Visit: 4/29/2025       Assessment & Plan (Medical Decision Making)    Impression: 64 y.o. female with recurrent episodes of PNA. In the past with some tree in bud changes to CT scan and + MAC in 2024. More recently with large infiltrate in the R with some smaller patchy infiltrates B. Bronch with negative cultures March 2025 including negative AFB but with organizing pna on Tbbx.   Discussed this diagnosis and the usual treatment at length today.   She is intolerant to prednisone and generally prefers a more holistic approach to treatments.     Clinically she is doing relatively well but having some asthma like symptoms with chest tightness, wheeze with exertion and cough with mucus production. Has been prescribed airsupra but no yet received from the pharmacy.       -just had repeat CXR through [facility] internal medicine. Trying to get the report faxed as the disc she brought did not contain any data.   -if this is totally clear then we will hold on therapy for now.   -if there are ongoing infiltrates however would advocate for rpeeat CT and starting therapy.   -discussed small studies using azithro/clarithro instead of prednisone or second line agents such as azathioprine.   She will consider these options.   -she is also to move to [place] for a 4-6 month stretch soon to see if getting out of her current living environment helps. She lives in an old farm house and cares for chickens and goats, all of which could contribute to an HP like picture.   -messaged PA office to see if can help with getting her airsupra.     For now plan office follow up in 6 months when she returns from [place].     1. Organizing pneumonia (HCC)      2. Chronic cough      3. History of MAC infection      No orders of the defined types were placed in this encounter.    No orders of the defined types were placed in this encounter.    Vin Everett MD    No

## 2025-04-30 ENCOUNTER — TELEPHONE (OUTPATIENT)
Dept: PULMONOLOGY | Age: 65
End: 2025-04-30

## 2025-04-30 NOTE — TELEPHONE ENCOUNTER
----- Message from Dr. Vin Everett MD sent at 4/29/2025  3:31 PM EDT -----  Pt waiting for airsupra. Sounds like may be related to PA. This was submitted 4/25 but pt still does not have inhaler. Can you please help.     Updated PA sent to [employer] [place]. Key: [id number]    Your Request has been approved     We have approved your request for AIRSUPRA Aerosol for THERSA BRANDON as it meets our drug coverage requirements. This approval is valid from 05/01/2025 to 05/01/2026, and the number of refills is 11.

## 2025-05-30 ENCOUNTER — TELEPHONE (OUTPATIENT)
Dept: PULMONOLOGY | Age: 65
End: 2025-05-30

## 2025-05-30 NOTE — TELEPHONE ENCOUNTER
Last seen: 4/29/25  Hx: recurrent PNA, h/o MAC    Last visit notes include:   -if there are ongoing infiltrates however would advocate for rpeeat CT and starting therapy.   -discussed small studies using azithro/clarithro instead of prednisone or second line agents such as azathioprine.   She will consider these options.   -she is also to move to [place] for a 4-6 month stretch soon to see if getting out of her current living environment helps. She lives in an old farm house and cares for chickens and goats, all of which could contribute to an HP like picture.    Patient was able to pickup airsupra earlier this week, has called in reporting diagnosised w/ PNA again. Started abx 3 days ago and not feeling better.     Contacted patient to review symptoms, reports she is just feeling so exhausted and normally feels better this far into course is feeling much better. Started airspura today. Asking if there is anything else she can do.

## 2025-05-30 NOTE — TELEPHONE ENCOUNTER
Patient is calling to say that she has been diagnose with pneumonia again.  She went to [facility] and they did chest xray.  This time it's in the left lung.  They stated her on Avelox and she has taken this for 3 days and feels no better

## 2025-06-13 ENCOUNTER — TELEPHONE (OUTPATIENT)
Dept: PULMONOLOGY | Age: 65
End: 2025-06-13

## 2025-06-13 NOTE — TELEPHONE ENCOUNTER
Called patient to schedule next appointment with Dr. Everett. Patient stated she is in [place] and will be there for the next 6 months. Will call when she gets back to schedule       Return in about 6 months (around 10/29/2025) for CXR @ next appt, with me or Haydee.

## (undated) DEVICE — FORCEPS BX L115CM ALGTR JAW STP DISP

## (undated) DEVICE — SINGLE USE BIOPSY VALVE MAJ-210: Brand: SINGLE USE BIOPSY VALVE (STERILE)

## (undated) DEVICE — MOUTHPIECE ENDOSCP L CTRL OPN AND SIDE PORTS DISP

## (undated) DEVICE — SINGLE USE SUCTION VALVE MAJ-209: Brand: SINGLE USE SUCTION VALVE (STERILE)

## (undated) DEVICE — SURGICAL PROCEDURE KIT BRONCHSCP CUST

## (undated) DEVICE — Device: Brand: PORTEX

## (undated) DEVICE — KIT SURG CUST BRONCHSCP CUST SFD